# Patient Record
Sex: MALE | Race: WHITE | NOT HISPANIC OR LATINO | Employment: OTHER | URBAN - METROPOLITAN AREA
[De-identification: names, ages, dates, MRNs, and addresses within clinical notes are randomized per-mention and may not be internally consistent; named-entity substitution may affect disease eponyms.]

---

## 2017-01-30 ENCOUNTER — HOSPITAL ENCOUNTER (OUTPATIENT)
Dept: SLEEP MEDICINE | Facility: HOSPITAL | Age: 82
Discharge: HOME | End: 2017-01-30
Admitting: INTERNAL MEDICINE

## 2017-01-30 PROCEDURE — G0463 HOSPITAL OUTPT CLINIC VISIT: HCPCS

## 2017-01-31 NOTE — PROGRESS NOTES
DATE OF VISIT: 01/30/2017    REFERRING PHYSICIAN: Riki Olivares MD    HISTORY OF PRESENT ILLNESS: This is a very pleasant 88-year-old gentleman here for followup. The patient has underlying history of sleep apnea, currently set up on CPAP 10 cm. Compliance today is 52%, but in the last 1-1/2 months it is 100% with AHI and leak both excellent. At this time, the patient goes to bed at 10 and gets up at 6, gets about 8 hours of sleep and feels rested. No tobacco abuse. No caffeine abuse. [employer] is the [employer]. Full face mask.    REVIEW OF SYSTEMS: Positive for cough. [place] Sleepiness Scale Score is 6 out of 24, within normal limits.     MEDICATIONS:  1. Lasix.  2. Amiodarone.  3. Levothyroxine.  4. Finasteride.  5. Zantac.  6. Zyrtec.    PHYSICAL EXAMINATION:  GENERAL: Elderly gentleman in no distress.   VITAL SIGNS: Blood pressure 109/60, heart rate 75, weight 188 pounds.   HEENT: Mallampati II.   NECK: Supple. No bruit. No adenopathy.   CHEST: Clear.   CARDIOVASCULAR: Regular rate and rhythm.   ABDOMEN: Soft, nontender. Bowel sounds positive.   EXTREMITIES: No edema.   CENTRAL NERVOUS SYSTEM: No deficits.     IMPRESSION: Obstructive sleep apnea syndrome with comorbidities of hypertension, pacemaker and history of AFib.     RECOMMENDATIONS: At this point, I reviewed download with the patient. His compliance has improved in the last 1-1/2 months. Continue CPAP 10 cm. Supplies were sent to DME company. Sleep hygiene measures reinforced. Follow up in 1 year.           Alex Kat M.D.  TS:[email or web address]  D:   01/30/2017 13:36:47  T:   01/31/2017 03:13:36  Job ID:   [id number]  Document ID:   [id number]  cc:   Riki Olivares M.D.

## 2017-03-21 ENCOUNTER — CLINICAL SUPPORT NO REQUIREMENTS (OUTPATIENT)
Dept: CARDIOLOGY | Facility: CLINIC | Age: 82
End: 2017-03-21

## 2017-03-21 ENCOUNTER — OFFICE VISIT (OUTPATIENT)
Dept: CARDIOLOGY | Facility: CLINIC | Age: 82
End: 2017-03-21

## 2017-03-21 VITALS
WEIGHT: 185 LBS | SYSTOLIC BLOOD PRESSURE: 132 MMHG | HEIGHT: 78 IN | BODY MASS INDEX: 21.4 KG/M2 | DIASTOLIC BLOOD PRESSURE: 70 MMHG | HEART RATE: 71 BPM

## 2017-03-21 DIAGNOSIS — I34.0 NON-RHEUMATIC MITRAL REGURGITATION: Primary | ICD-10-CM

## 2017-03-21 DIAGNOSIS — I42.8 NON-ISCHEMIC CARDIOMYOPATHY (HCC): ICD-10-CM

## 2017-03-21 DIAGNOSIS — I50.9 CONGESTIVE HEART FAILURE, UNSPECIFIED CONGESTIVE HEART FAILURE CHRONICITY, UNSPECIFIED CONGESTIVE HEART FAILURE TYPE: Primary | ICD-10-CM

## 2017-03-21 DIAGNOSIS — I48.20 CHRONIC ATRIAL FIBRILLATION (HCC): ICD-10-CM

## 2017-03-21 DIAGNOSIS — Z95.0 STATUS POST BIVENTRICULAR PACEMAKER: ICD-10-CM

## 2017-03-21 PROCEDURE — 93000 ELECTROCARDIOGRAM COMPLETE: CPT | Performed by: INTERNAL MEDICINE

## 2017-03-21 PROCEDURE — 99213 OFFICE O/P EST LOW 20 MIN: CPT | Performed by: INTERNAL MEDICINE

## 2017-03-21 PROCEDURE — 93280 PM DEVICE PROGR EVAL DUAL: CPT | Performed by: INTERNAL MEDICINE

## 2017-03-21 NOTE — PROGRESS NOTES
Subjective:     Encounter Date:03/21/2017      Patient ID: Zeb Obando Jr. is a 88 y.o. male.    Chief Complaint:  History of Present Illness     The patient is an 88-year-old male with a history of prior nonischemic cardiomyopathy, nonobstructive coronary artery disease, chronic atrial fibrillation, history of ventricular tachycardia arrest, prior biventricular AICD, which has since been replaced with a biventricular pacemaker, and hypothyroidism who presents for follow-up.  He was followed previously by both Dr. Garcia and Dr. Whitney with [facility].  His prior cardiac history includes placement of a permanent pacemaker in 1981 for a complete heart block.  In 2006, he presented with a syncopal episode that was associated with ventricular tachycardia/ventricular fibrillation episode that was detected on his pacemaker.  They abandoned his right-sided pacemaker and placed a biventricular AICD in his left side.  An ischemic workup including a cardiac catheterization showed mild coronary artery disease.  His ejection fraction was around 10% to 15%.  Following his biventricular AICD placement, repeat echocardiogram in 04/2014 showed his ejection fraction improved to 45% to 50%.  In 10/2015, his device was at the [facility] and, after a conversation with his family, the patient decided he no longer wanted the AICD and had it replaced with a biventricular pacemaker.  He also has a history of chronic atrial fibrillation for which he is not on anticoagulation because of frailty and recurrent falls.      I saw him initially in 03/2016 when he presented to [facility].  He underwent an echocardiogram in 06/2016 that showed normal left ventricular systolic function and wall motion, ejection fraction of 60%, normal diastolic function, and mitral valve prolapse of the anterior leaflet with severe mitral regurgitation. His right ventricular pressure was mildly elevated.  He was doing well at that time.  He was  planning on getting surgery done on a hernia.  Based on his lack of symptoms, I decided to continue his medical management and felt he could proceed with his planned surgery.      Today, he presents for routine follow-up.  He has been having a lot of diaphragmatic pacing from his pacemaker that he has found that he has been having issues with.  Several changes were tried during his pacemaker check today.  Because he is pacemaker dependent, the patient became quite symptomatic while these changes were tried.  The final changes made were to increase the left ventricular pulse to one millisecond, left ventricular amplitude was decreased to 1.25 volts (from 1.5 volts), and the left ventricular captured management to adaptive with a positive 0.5-volt safety margin and a maximum amplitude of 1.5 volts.      Today, in the office, he reports that he just feels different.  He does not quite feel lightheaded or presyncopal.  He otherwise denies any chest pain, shortness of breath, paroxysmal nocturnal dyspnea or orthopnea, presyncope or syncope, or lower extremity edema.  This feeling he reports just started after his pacemaker evaluation.          Review of Systems   Constitution: Negative for weakness and malaise/fatigue.   HENT: Negative for headaches, hearing loss, hoarse voice, nosebleeds and sore throat.    Eyes: Negative for pain.   Cardiovascular: Negative for chest pain, claudication, cyanosis, dyspnea on exertion, irregular heartbeat, leg swelling, near-syncope, orthopnea, palpitations, paroxysmal nocturnal dyspnea and syncope.   Respiratory: Negative for shortness of breath and snoring.    Endocrine: Negative for cold intolerance, heat intolerance, polydipsia, polyphagia and polyuria.   Skin: Negative for itching and rash.   Musculoskeletal: Negative for arthritis, falls, joint pain, joint swelling, muscle cramps, muscle weakness and myalgias.   Gastrointestinal: Negative for constipation, diarrhea, dysphagia,  heartburn, hematemesis, hematochezia, melena, nausea and vomiting.   Genitourinary: Negative for frequency, hematuria and hesitancy.   Neurological: Negative for excessive daytime sleepiness, dizziness, light-headedness and numbness.   Psychiatric/Behavioral: Negative for depression. The patient is not nervous/anxious.           Current Outpatient Prescriptions:   •  Acetaminophen (TYLENOL PO), Take 325 mg by mouth As Needed., Disp: , Rfl:   •  amiodarone (PACERONE) 200 MG tablet, Take 100 mg by mouth Every Night., Disp: , Rfl:   •  cetirizine (ZyrTEC) 10 MG tablet, Take 10 mg by mouth daily., Disp: , Rfl:   •  docusate sodium (COLACE) 100 MG capsule, Take 1 capsule by mouth 2 (Two) Times a Day., Disp: 60 capsule, Rfl: 1  •  finasteride (PROSCAR) 5 MG tablet, Take 5 mg by mouth Daily., Disp: , Rfl:   •  furosemide (LASIX) 40 MG tablet, Take 40 mg by mouth Daily., Disp: , Rfl:   •  levothyroxine (SYNTHROID, LEVOTHROID) 100 MCG tablet, Take 100 mcg by mouth Every Morning., Disp: , Rfl:   •  RaNITidine HCl (ZANTAC PO), Take 1 tablet by mouth As Needed., Disp: , Rfl:   •  triamcinolone (KENALOG) 0.1 % lotion, Apply 1 application topically As Needed., Disp: , Rfl:     Past Medical History   Diagnosis Date   • Abnormal ECG 1979   • AICD (automatic cardioverter/defibrillator) present    • Atrial fibrillation    • AV block    • Blockage of kidney vein    • BPH (benign prostatic hypertrophy)    • Cardiac arrest    • Cardiomyopathy    • CHF (congestive heart failure)    • Chronic kidney disease    • Clotting disorder    • Coronary artery disease    • Disease of thyroid gland    • Diverticulitis of colon    • Diverticulosis    • Edema    • Gallbladder disease    • GI (gastrointestinal bleed)      while on coumadin   • Health care maintenance    • Hearing loss of both ears    • Heart disease    • Heart failure    • History of benign prostatic hypertrophy    • History of cataract    • History of heart failure    • History of  hypertension    • History of prostatitis    • Hoarseness    • Hypertension    • Inguinal hernia    • Irregular heart rate    • Itchy skin    • Lupus    • Muscle weakness    • Prostatitis    • SLE (systemic lupus erythematosus) 1980   • Sleep apnea    • Systemic lupus erythematosus    • Thyroid disease    • VT (ventricular tachycardia)    • Wears glasses    • Weight loss      Past Surgical History   Procedure Laterality Date   • Cholecystectomy  1995   • Pacemaker implantation       RIGHT CHEST-NONFUNCTIONING   • Coronary angioplasty     • Cataract extraction     • Nose surgery     • Cardiac pacemaker placement  10/30/2015     [facility] [facility] PAC[facility]-DR. REINA KATZ-LEFT CHEST   • Cystoscopy transurethral resection of prostate       X 2   • Inguinal hernia repair Right 10/10/2016     Procedure: LAPAROSCOPIC RIGHT INGUINAL HERNIA  REPAIR WITH MESH;  Surgeon: Saad Cardenas MD;  Location: [facility] OR;  Service:      Family History   Problem Relation Age of Onset   • Heart disease Mother      Cancer & Heart   • Cancer Mother      vaginal   • Heart disease Brother      pacemaker   • Lung cancer Brother    • Lung cancer Father    • Alcohol abuse Father    • Heart disease Brother      Cancer     Social History   Substance Use Topics   • Smoking status: Former Smoker     Packs/day: 0.25     Years: 0.50     Start date: 9/1/1953     Quit date: 1953   • Smokeless tobacco: Never Used   • Alcohol use Yes     0 Glasses of wine, 0 Cans of beer, 0 Shots of liquor, 0 Standard drinks or equivalent per week      Comment: Beer with boiled seafood; wine S TOASTS           ECG 12 Lead  Date/Time: 3/21/2017 4:31 PM  Performed by: MIRLANDE ANDERSON  Authorized by: MIRLANDE ANDERSON   Comparison: compared with previous ECG   Similar to previous ECG  Comments: Ventricular paced rhythm               Objective:         Visit Vitals   • /70 (BP Location: Right arm, Patient Position: Sitting)   • Pulse 71   • Ht  "80\" (203.2 cm)   • Wt 185 lb (83.9 kg)   • BMI 20.32 kg/m2          Physical Exam   Constitutional: He is oriented to person, place, and time. He appears well-developed and well-nourished.   HENT:   Head: Normocephalic and atraumatic.   Eyes: Conjunctivae, EOM and lids are normal. Pupils are equal, round, and reactive to light.   Neck: Normal range of motion and full passive range of motion without pain. Neck supple. No JVD present. Carotid bruit is not present.   Cardiovascular: Normal rate, regular rhythm, S1 normal and S2 normal.  Exam reveals no gallop.    No murmur heard.  Pulses:       Radial pulses are 2+ on the right side, and 2+ on the left side.   No bilateral lower extremity edema   Pulmonary/Chest: Effort normal and breath sounds normal.   Abdominal: Soft. Normal appearance.   Lymphadenopathy:     He has no cervical adenopathy.   Neurological: He is alert and oriented to person, place, and time.   Skin: Skin is warm, dry and intact.   Psychiatric: He has a normal mood and affect.       Lab Review:       Assessment:          Diagnosis Plan   1. Non-rheumatic mitral regurgitation     2. Chronic atrial fibrillation     3. Status post biventricular pacemaker     4. History of non-ischemic cardiomyopathy            Plan:        1. Status post biventricular pacemaker.  Again, he has had changes done to help cut down on his diaphragmatic pacing today.  He says he is a little different since the pacemaker changes were made.  I suspect this is more due to the symptoms he had while different changes were being tried.  It appears that he was quite symptomatic while things were being changed.  I asked him to monitor his symptoms for now and, if they do not improve, to let me know.  I suspect they will if we give him some time.  Hopefully, the changes that were made today in the clinic should help some with his diaphragmatic pacing.    2. History of nonischemic cardiomyopathy.  This has since resolved.  His last " echocardiogram shows an ejection fraction of 60%.    3. Mitral regurgitation.  He had severe prolapse of anterior leaflet with severe mitral regurgitation.  He remains asymptomatic.  He will continue medical management with Lasix.    4. Atrial fibrillation.  He is not a good anticoagulation candidate.  He appears to be in sinus rhythm.  He is on amiodarone for his history of ventricular ectopy.    5. Nonobstructive coronary artery disease noted on cardiac catheterization from 2006.  He is not having any further symptoms that would warrant further ischemic workup at this time.    6. Hypothyroidism.      We will plan on seeing the patient back again in about six months.      Atrial Fibrillation and Atrial Flutter  Assessment  • The patient has permanent atrial fibrillation  • This is non-valvular in etiology  • The patient's CHADS2-VASc score is 3  • A [id number] score of 2 or more is considered a high risk for a thromboembolic event    Plan  • Continue in atrial fibrillation with rate control  • Continue amiodarone for rhythm control

## 2017-06-21 ENCOUNTER — CLINICAL SUPPORT NO REQUIREMENTS (OUTPATIENT)
Dept: CARDIOLOGY | Facility: CLINIC | Age: 82
End: 2017-06-21

## 2017-06-21 DIAGNOSIS — I50.22 CHRONIC SYSTOLIC CONGESTIVE HEART FAILURE (HCC): Primary | ICD-10-CM

## 2017-06-21 PROCEDURE — 93297 REM INTERROG DEV EVAL ICPMS: CPT | Performed by: INTERNAL MEDICINE

## 2017-06-21 PROCEDURE — 93294 REM INTERROG EVL PM/LDLS PM: CPT | Performed by: INTERNAL MEDICINE

## 2017-06-21 PROCEDURE — 93296 REM INTERROG EVL PM/IDS: CPT | Performed by: INTERNAL MEDICINE

## 2017-09-22 ENCOUNTER — CLINICAL SUPPORT NO REQUIREMENTS (OUTPATIENT)
Dept: CARDIOLOGY | Facility: CLINIC | Age: 82
End: 2017-09-22

## 2017-09-22 ENCOUNTER — OFFICE VISIT (OUTPATIENT)
Dept: CARDIOLOGY | Facility: CLINIC | Age: 82
End: 2017-09-22

## 2017-09-22 VITALS
DIASTOLIC BLOOD PRESSURE: 68 MMHG | SYSTOLIC BLOOD PRESSURE: 102 MMHG | HEIGHT: 72 IN | WEIGHT: 179 LBS | HEART RATE: 72 BPM | BODY MASS INDEX: 24.24 KG/M2

## 2017-09-22 DIAGNOSIS — I50.22 CHRONIC SYSTOLIC CONGESTIVE HEART FAILURE (HCC): ICD-10-CM

## 2017-09-22 DIAGNOSIS — Z95.0 CARDIAC RESYNCHRONIZATION THERAPY PACEMAKER (CRT-P) IN PLACE: ICD-10-CM

## 2017-09-22 DIAGNOSIS — I48.20 CHRONIC ATRIAL FIBRILLATION (HCC): Primary | ICD-10-CM

## 2017-09-22 DIAGNOSIS — I42.8 NONISCHEMIC CARDIOMYOPATHY (HCC): ICD-10-CM

## 2017-09-22 DIAGNOSIS — I42.8 NON-ISCHEMIC CARDIOMYOPATHY (HCC): ICD-10-CM

## 2017-09-22 DIAGNOSIS — I34.0 NON-RHEUMATIC MITRAL REGURGITATION: ICD-10-CM

## 2017-09-22 PROCEDURE — 99213 OFFICE O/P EST LOW 20 MIN: CPT | Performed by: INTERNAL MEDICINE

## 2017-09-22 PROCEDURE — 93290 INTERROG DEV EVAL ICPMS IP: CPT | Performed by: INTERNAL MEDICINE

## 2017-09-22 PROCEDURE — 93280 PM DEVICE PROGR EVAL DUAL: CPT | Performed by: INTERNAL MEDICINE

## 2017-09-22 PROCEDURE — 93000 ELECTROCARDIOGRAM COMPLETE: CPT | Performed by: INTERNAL MEDICINE

## 2017-09-22 NOTE — PROGRESS NOTES
Subjective:     Encounter Date:09/22/2017      Patient ID: Zeb Obando Jr. is a 89 y.o. male.    Chief Complaint:  History of Present Illness    This is a 89-year-old male with a history of prior nonischemic cardiomyopathy, nonobstructive coronary artery disease, chronic atrial fibrillation, history of ventricular tachycardia arrest, biventricular pacemaker, hypothyroidism who presents for follow-up.    The patient was previously followed by Dr. Garcia and Dr. Whitney with [facility].  His prior cardiac history includes placement of a permanent pacemaker in 1981 for complete heart block.  And in 2006 he presented with a syncopal episode that was associated with ventricular tachycardia/ventricular fibrillation episode that was detected on this pacemaker.  He was found to have an ejection fraction of around 10-15% around that time.  A cardiac catheterization showed only mild coronary artery disease.  At that point they abandoned his right-sided pacemaker in place a biventricular AICD on his left side.  Following his BiVICD placement a repeat echocardiogram was performed in 4/2014 that showed improvement of his ejection fraction to 45-50%.  In 10/2015 his device was at [facility] and after conversation with his family patient decided he no longer wanted the AICD and had it replaced with a biventricular pacemaker.   He has a history of chronic atrial fibrillation for which she has not been on anticoagulation because of overall frailty and recurrent falls.    When I began following the patient 3/2016 I repeated echocardiogram that was performed in 6/2016.  This showed normal left ventricular systolic function and wall motion with an ejection fraction of 60%, normal diastolic function, mitral valve prolapse of the anterior leaflet with severe mitral regurgitation, mildly elevated right ventricular pressures.    Today he presents for routine follow-up.  He feels well overall today.  He denies any change in  his chronic dyspnea on exertion.  He denies any chest pain, palpitations, dizziness or lightheadedness, PND orthopnea, presyncope or syncope, or lower extremity edema.  Denies any changes in his medications recently.  He reports that he has a full physical scheduled with Dr. Olivares later this month and will get lab work and a chest x-ray done at that time.    Review of Systems   Constitution: Negative for weakness and malaise/fatigue.   HENT: Negative for hearing loss, hoarse voice, nosebleeds and sore throat.    Eyes: Negative for pain.   Cardiovascular: Positive for dyspnea on exertion. Negative for chest pain, claudication, cyanosis, irregular heartbeat, leg swelling, near-syncope, orthopnea, palpitations, paroxysmal nocturnal dyspnea and syncope.   Respiratory: Negative for shortness of breath and snoring.    Endocrine: Negative for cold intolerance, heat intolerance, polydipsia, polyphagia and polyuria.   Skin: Negative for itching and rash.   Musculoskeletal: Negative for arthritis, falls, joint pain, joint swelling, muscle cramps, muscle weakness and myalgias.   Gastrointestinal: Negative for constipation, diarrhea, dysphagia, heartburn, hematemesis, hematochezia, melena, nausea and vomiting.   Genitourinary: Positive for frequency. Negative for hematuria and hesitancy.   Neurological: Negative for excessive daytime sleepiness, dizziness, headaches, light-headedness and numbness.   Psychiatric/Behavioral: Negative for depression. The patient is not nervous/anxious.           Current Outpatient Prescriptions:   •  Acetaminophen (TYLENOL PO), Take 325 mg by mouth As Needed., Disp: , Rfl:   •  amiodarone (PACERONE) 200 MG tablet, Take 100 mg by mouth Every Night., Disp: , Rfl:   •  cetirizine (ZyrTEC) 10 MG tablet, Take 10 mg by mouth daily., Disp: , Rfl:   •  docusate sodium (COLACE) 100 MG capsule, Take 1 capsule by mouth 2 (Two) Times a Day., Disp: 60 capsule, Rfl: 1  •  finasteride (PROSCAR) 5 MG tablet, Take  5 mg by mouth Daily., Disp: , Rfl:   •  furosemide (LASIX) 40 MG tablet, Take 40 mg by mouth Daily., Disp: , Rfl:   •  levothyroxine (SYNTHROID, LEVOTHROID) 100 MCG tablet, Take 100 mcg by mouth Every Morning., Disp: , Rfl:   •  RaNITidine HCl (ZANTAC PO), Take 1 tablet by mouth As Needed., Disp: , Rfl:   •  triamcinolone (KENALOG) 0.1 % lotion, Apply 1 application topically As Needed., Disp: , Rfl:     Past Medical History:   Diagnosis Date   • Abnormal ECG 1979   • AICD (automatic cardioverter/defibrillator) present    • Atrial fibrillation    • AV block    • Blockage of kidney vein    • BPH (benign prostatic hypertrophy)    • Cardiac arrest    • Cardiomyopathy    • CHF (congestive heart failure)    • Chronic kidney disease    • Clotting disorder    • Coronary artery disease    • Disease of thyroid gland    • Diverticulitis of colon    • Diverticulosis    • Edema    • Gallbladder disease    • GI (gastrointestinal bleed)     while on coumadin   • Health care maintenance    • Hearing loss of both ears    • Heart disease    • Heart failure    • History of benign prostatic hypertrophy    • History of cataract    • History of heart failure    • History of hypertension    • History of prostatitis    • Hoarseness    • Hypertension    • Inguinal hernia    • Irregular heart rate    • Itchy skin    • Lupus    • Muscle weakness    • Prostatitis    • SLE (systemic lupus erythematosus) 1980   • Sleep apnea    • Systemic lupus erythematosus    • Thyroid disease    • VT (ventricular tachycardia)    • Wears glasses    • Weight loss      Past Surgical History:   Procedure Laterality Date   • CARDIAC PACEMAKER PLACEMENT  10/30/2015    [employer] PAC[employer]-DR. REINA KATZ-LEFT CHEST   • CATARACT EXTRACTION     • CHOLECYSTECTOMY  1995   • CORONARY ANGIOPLASTY     • CYSTOSCOPY TRANSURETHRAL RESECTION OF PROSTATE      X 2   • INGUINAL HERNIA REPAIR Right 10/10/2016    Procedure: LAPAROSCOPIC RIGHT INGUINAL HERNIA   "REPAIR WITH MESH;  Surgeon: Saad Cardenas MD;  Location: [facility] OR;  Service:    • NOSE SURGERY     • PACEMAKER IMPLANTATION      RIGHT CHEST-NONFUNCTIONING     Family History   Problem Relation Age of Onset   • Heart disease Mother      Cancer & Heart   • Cancer Mother      vaginal   • Heart disease Brother      pacemaker   • Lung cancer Brother    • Lung cancer Father    • Alcohol abuse Father    • Heart disease Brother      Cancer     Social History   Substance Use Topics   • Smoking status: Former Smoker     Packs/day: 0.25     Years: 0.50     Start date: 9/1/1953     Quit date: 1953   • Smokeless tobacco: Never Used   • Alcohol use Yes     0 Glasses of wine, 0 Cans of beer, 0 Shots of liquor, 0 Standard drinks or equivalent per week      Comment: Beer with boiled seafood; wine S TOASTS           ECG 12 Lead  Date/Time: 9/22/2017 1:31 PM  Performed by: MIRLANDE ANDERSON  Authorized by: MIRLANDE ANDERSON   Comparison: compared with previous ECG   Similar to previous ECG  Comments: Biventricular paced rhythm               Objective:         Visit Vitals   • /68 (BP Location: Right arm, Patient Position: Sitting)   • Pulse 72   • Ht 72\" (182.9 cm)   • Wt 179 lb (81.2 kg)   • BMI 24.28 kg/m2          Physical Exam   Constitutional: He is oriented to person, place, and time. He appears well-developed and well-nourished.   HENT:   Head: Normocephalic and atraumatic.   Eyes: Conjunctivae, EOM and lids are normal. Pupils are equal, round, and reactive to light.   Neck: Normal range of motion and full passive range of motion without pain. Neck supple. No JVD present. Carotid bruit is not present.   Cardiovascular: Normal rate, regular rhythm, S1 normal and S2 normal.  Exam reveals no gallop.    No murmur heard.  Pulses:       Radial pulses are 2+ on the right side, and 2+ on the left side.   No bilateral lower extremity edema   Pulmonary/Chest: Effort normal and breath sounds normal.   Abdominal: Soft. " Normal appearance.   Lymphadenopathy:     He has no cervical adenopathy.   Neurological: He is alert and oriented to person, place, and time.   Skin: Skin is warm, dry and intact.   Psychiatric: He has a normal mood and affect.       Lab Review:       Assessment:          Diagnosis Plan   1. Chronic atrial fibrillation     2. History of non-ischemic cardiomyopathy     3. Non-rheumatic mitral regurgitation     4. Cardiac resynchronization therapy pacemaker (CRT-P) in place            Plan:       1.  Status post biventricular pacemaker.  Functioning normally today.  No recent events.  Continue routine checks through pacemaker clinic.  2.  History of nonischemic cardiomyopathy.  This has resolved.  Most recent ejection fraction was at 60%.  3.  Mitral regurgitation.  There is severe and his last echocardiogram.  He is asymptomatic.  Continue current management.  4.  Atrial fibrillation.  He appears to be in sinus rhythm today.  He is on amiodarone for his history of ventricular ectopy.  He has not been felt to be a good candidate for anticoagulation due to history of falls and overall frailty.  5.  Nonobstructive coronary artery disease.  Noted on his last cardiac catheterization in 2006.  No new symptoms to warrant a repeat ischemic workup at this time.  6.  Hypothyroidism.  7.  Chronic amiodarone use.  We'll try to obtain copies of his upcoming lab work including his liver panel and thyroid function panel along with chest x-ray results due to his chronic amiodarone use.  8.  History of ventricular tachycardia/fibrillation.  Related to his prior history of cardiomyopathy.    We'll plan on seeing the patient back in 6 months.

## 2017-10-11 ENCOUNTER — TRANSCRIBE ORDERS (OUTPATIENT)
Dept: ADMINISTRATIVE | Facility: HOSPITAL | Age: 82
End: 2017-10-11

## 2017-10-11 DIAGNOSIS — R10.9 RIGHT SIDED ABDOMINAL PAIN: Primary | ICD-10-CM

## 2017-10-16 ENCOUNTER — HOSPITAL ENCOUNTER (OUTPATIENT)
Dept: CT IMAGING | Facility: HOSPITAL | Age: 82
Discharge: HOME | End: 2017-10-16
Attending: INTERNAL MEDICINE | Admitting: INTERNAL MEDICINE

## 2017-10-16 DIAGNOSIS — R10.9 RIGHT SIDED ABDOMINAL PAIN: ICD-10-CM

## 2017-10-16 PROCEDURE — 74176 CT ABD & PELVIS W/O CONTRAST: CPT

## 2017-12-04 ENCOUNTER — TRANSCRIBE ORDERS (OUTPATIENT)
Dept: ADMINISTRATIVE | Facility: HOSPITAL | Age: 82
End: 2017-12-04

## 2017-12-04 DIAGNOSIS — N18.4 ANEMIA IN STAGE 4 CHRONIC KIDNEY DISEASE (HCC): Primary | ICD-10-CM

## 2017-12-04 DIAGNOSIS — N18.4 CHRONIC RENAL DISEASE, STAGE IV (HCC): ICD-10-CM

## 2017-12-04 DIAGNOSIS — D63.1 ANEMIA IN STAGE 4 CHRONIC KIDNEY DISEASE (HCC): Primary | ICD-10-CM

## 2017-12-06 PROBLEM — D63.1 ANEMIA OF CHRONIC RENAL FAILURE: Status: ACTIVE | Noted: 2017-12-06

## 2017-12-06 PROBLEM — N18.9 ANEMIA OF CHRONIC RENAL FAILURE: Status: ACTIVE | Noted: 2017-12-06

## 2017-12-07 ENCOUNTER — HOSPITAL ENCOUNTER (OUTPATIENT)
Dept: INFUSION THERAPY | Facility: HOSPITAL | Age: 82
Discharge: HOME | End: 2017-12-07
Attending: INTERNAL MEDICINE | Admitting: INTERNAL MEDICINE

## 2017-12-07 VITALS
HEART RATE: 79 BPM | OXYGEN SATURATION: 96 % | BODY MASS INDEX: 24.92 KG/M2 | RESPIRATION RATE: 20 BRPM | SYSTOLIC BLOOD PRESSURE: 137 MMHG | WEIGHT: 184 LBS | TEMPERATURE: 95 F | HEIGHT: 72 IN | DIASTOLIC BLOOD PRESSURE: 67 MMHG

## 2017-12-07 DIAGNOSIS — D63.1 ANEMIA OF CHRONIC RENAL FAILURE, STAGE 4 (SEVERE) (HCC): ICD-10-CM

## 2017-12-07 DIAGNOSIS — N18.4 ANEMIA OF CHRONIC RENAL FAILURE, STAGE 4 (SEVERE) (HCC): ICD-10-CM

## 2017-12-07 LAB
25(OH)D3 SERPL-MCNC: 59.4 NG/ML (ref 30–100)
ALBUMIN SERPL-MCNC: 3.8 G/DL (ref 3.5–5.2)
ALBUMIN/GLOB SERPL: 1 G/DL
ALP SERPL-CCNC: 77 U/L (ref 39–117)
ALT SERPL W P-5'-P-CCNC: 9 U/L (ref 1–41)
ANION GAP SERPL CALCULATED.3IONS-SCNC: 12.2 MMOL/L
AST SERPL-CCNC: 18 U/L (ref 1–40)
BILIRUB SERPL-MCNC: 0.7 MG/DL (ref 0.1–1.2)
BUN BLD-MCNC: 35 MG/DL (ref 8–23)
BUN/CREAT SERPL: 12.2 (ref 7–25)
CALCIUM SPEC-SCNC: 9.2 MG/DL (ref 8.6–10.5)
CALCIUM SPEC-SCNC: 9.4 MG/DL (ref 8.6–10.5)
CHLORIDE SERPL-SCNC: 106 MMOL/L (ref 98–107)
CO2 SERPL-SCNC: 23.8 MMOL/L (ref 22–29)
CREAT BLD-MCNC: 2.88 MG/DL (ref 0.76–1.27)
DEPRECATED RDW RBC AUTO: 53.3 FL (ref 37–54)
ERYTHROCYTE [DISTWIDTH] IN BLOOD BY AUTOMATED COUNT: 15.3 % (ref 11.5–14.5)
FERRITIN SERPL-MCNC: 46.16 NG/ML (ref 30–400)
FOLATE SERPL-MCNC: 16.89 NG/ML (ref 4.78–24.2)
GFR SERPL CREATININE-BSD FRML MDRD: 21 ML/MIN/1.73
GLOBULIN UR ELPH-MCNC: 3.9 GM/DL
GLUCOSE BLD-MCNC: 94 MG/DL (ref 65–99)
HCT VFR BLD AUTO: 29.7 % (ref 40.4–52.2)
HGB BLD-MCNC: 9.3 G/DL (ref 13.7–17.6)
IRON 24H UR-MRATE: 50 MCG/DL (ref 59–158)
IRON SATN MFR SERPL: 14 % (ref 20–50)
MCH RBC QN AUTO: 29.8 PG (ref 27–32.7)
MCHC RBC AUTO-ENTMCNC: 31.3 G/DL (ref 32.6–36.4)
MCV RBC AUTO: 95.2 FL (ref 79.8–96.2)
PHOSPHATE SERPL-MCNC: 3.8 MG/DL (ref 2.5–4.5)
PLATELET # BLD AUTO: 146 10*3/MM3 (ref 140–500)
PMV BLD AUTO: 9.3 FL (ref 6–12)
POTASSIUM BLD-SCNC: 4.4 MMOL/L (ref 3.5–5.2)
PROT SERPL-MCNC: 7.7 G/DL (ref 6–8.5)
PTH-INTACT SERPL-MCNC: 20.4 PG/ML (ref 15–65)
RBC # BLD AUTO: 3.12 10*6/MM3 (ref 4.6–6)
SODIUM BLD-SCNC: 142 MMOL/L (ref 136–145)
TIBC SERPL-MCNC: 361 MCG/DL (ref 298–536)
TRANSFERRIN SERPL-MCNC: 242 MG/DL (ref 200–360)
URATE SERPL-MCNC: 7.9 MG/DL (ref 3.4–7)
VIT B12 BLD-MCNC: 334 PG/ML (ref 211–946)
WBC NRBC COR # BLD: 3.2 10*3/MM3 (ref 4.5–10.7)

## 2017-12-07 PROCEDURE — 82607 VITAMIN B-12: CPT | Performed by: INTERNAL MEDICINE

## 2017-12-07 PROCEDURE — 84466 ASSAY OF TRANSFERRIN: CPT | Performed by: INTERNAL MEDICINE

## 2017-12-07 PROCEDURE — 83970 ASSAY OF PARATHORMONE: CPT | Performed by: INTERNAL MEDICINE

## 2017-12-07 PROCEDURE — 36415 COLL VENOUS BLD VENIPUNCTURE: CPT

## 2017-12-07 PROCEDURE — 82728 ASSAY OF FERRITIN: CPT | Performed by: INTERNAL MEDICINE

## 2017-12-07 PROCEDURE — 85027 COMPLETE CBC AUTOMATED: CPT | Performed by: INTERNAL MEDICINE

## 2017-12-07 PROCEDURE — 63510000001 EPOETIN ALFA PER 1000 UNITS: Performed by: INTERNAL MEDICINE

## 2017-12-07 PROCEDURE — 96372 THER/PROPH/DIAG INJ SC/IM: CPT

## 2017-12-07 PROCEDURE — 83540 ASSAY OF IRON: CPT | Performed by: INTERNAL MEDICINE

## 2017-12-07 PROCEDURE — 82306 VITAMIN D 25 HYDROXY: CPT | Performed by: INTERNAL MEDICINE

## 2017-12-07 PROCEDURE — 82746 ASSAY OF FOLIC ACID SERUM: CPT | Performed by: INTERNAL MEDICINE

## 2017-12-07 PROCEDURE — 84100 ASSAY OF PHOSPHORUS: CPT | Performed by: INTERNAL MEDICINE

## 2017-12-07 PROCEDURE — 84550 ASSAY OF BLOOD/URIC ACID: CPT | Performed by: INTERNAL MEDICINE

## 2017-12-07 PROCEDURE — 80053 COMPREHEN METABOLIC PANEL: CPT | Performed by: INTERNAL MEDICINE

## 2017-12-07 RX ORDER — MELATONIN
1000 DAILY
COMMUNITY

## 2017-12-07 RX ORDER — FERROUS SULFATE 325(65) MG
325 TABLET ORAL
COMMUNITY

## 2017-12-07 RX ADMIN — ERYTHROPOIETIN 20000 UNITS: 20000 INJECTION, SOLUTION INTRAVENOUS; SUBCUTANEOUS at 15:25

## 2017-12-07 NOTE — PROGRESS NOTES
Procrit indicated per lab results & MD order.  Injection site x 1 with band aide applied.  1st dose of Procrit so AVS information given and patient held for 30 minutes post injection with no S/S of reaction or patient complaints.  Pt D/C per ambulation @ [id number].

## 2017-12-07 NOTE — PATIENT INSTRUCTIONS
Epoetin De injection  What is this medicine?  EPOETIN DE (e RAHUL e tin AL fa) helps your body make more red blood cells. This medicine is used to treat anemia caused by chronic kidney failure, cancer chemotherapy, or HIV-therapy. It may also be used before surgery if you have anemia.  This medicine may be used for other purposes; ask your health care provider or pharmacist if you have questions.  COMMON BRAND NAME(S): Epogen, Procrit  What should I tell my health care provider before I take this medicine?  They need to know if you have any of these conditions:  -blood clotting disorders  -cancer patient not on chemotherapy  -cystic fibrosis  -heart disease, such as angina or heart failure  -hemoglobin level of 12 g/dL or greater  -high blood pressure  -low levels of folate, iron, or vitamin B12  -seizures  -an unusual or allergic reaction to erythropoietin, albumin, benzyl alcohol, hamster proteins, other medicines, foods, dyes, or preservatives  -pregnant or trying to get pregnant  -breast-feeding  How should I use this medicine?  This medicine is for injection into a vein or under the skin. It is usually given by a health care professional in a hospital or clinic setting.  If you get this medicine at home, you will be taught how to prepare and give this medicine. Use exactly as directed. Take your medicine at regular intervals. Do not take your medicine more often than directed.  It is important that you put your used needles and syringes in a special sharps container. Do not put them in a trash can. If you do not have a sharps container, call your pharmacist or healthcare provider to get one.  Talk to your pediatrician regarding the use of this medicine in children. While this drug may be prescribed for selected conditions, precautions do apply.  Overdosage: If you think you have taken too much of this medicine contact a poison control center or emergency room at once.  NOTE: This medicine is only for you. Do  not share this medicine with others.  What if I miss a dose?  If you miss a dose, take it as soon as you can. If it is almost time for your next dose, take only that dose. Do not take double or extra doses.  What may interact with this medicine?  Do not take this medicine with any of the following medications:  -darbepoetin nestor  This list may not describe all possible interactions. Give your health care provider a list of all the medicines, herbs, non-prescription drugs, or dietary supplements you use. Also tell them if you smoke, drink alcohol, or use illegal drugs. Some items may interact with your medicine.  What should I watch for while using this medicine?  Visit your prescriber or health care professional for regular checks on your progress and for the needed blood tests and blood pressure measurements. It is especially important for the doctor to make sure your hemoglobin level is in the desired range, to limit the risk of potential side effects and to give you the best benefit. Keep all appointments for any recommended tests. Check your blood pressure as directed. Ask your doctor what your blood pressure should be and when you should contact him or her.  As your body makes more red blood cells, you may need to take iron, folic acid, or vitamin B supplements. Ask your doctor or health care provider which products are right for you. If you have kidney disease continue dietary restrictions, even though this medication can make you feel better. Talk with your doctor or health care professional about the foods you eat and the vitamins that you take.  What side effects may I notice from receiving this medicine?  Side effects that you should report to your doctor or health care professional as soon as possible:  -allergic reactions like skin rash, itching or hives, swelling of the face, lips, or tongue  -breathing problems  -changes in vision  -chest pain  -confusion, trouble speaking or understanding  -feeling  faint or lightheaded, falls  -high blood pressure  -muscle aches or pains  -pain, swelling, warmth in the leg  -rapid weight gain  -severe headaches  -sudden numbness or weakness of the face, arm or leg  -trouble walking, dizziness, loss of balance or coordination  -seizures (convulsions)  -swelling of the ankles, feet, hands  -unusually weak or tired  Side effects that usually do not require medical attention (report to your doctor or health care professional if they continue or are bothersome):  -diarrhea  -fever, chills (flu-like symptoms)  -headaches  -nausea, vomiting  -redness, stinging, or swelling at site where injected  This list may not describe all possible side effects. Call your doctor for medical advice about side effects. You may report side effects to FDA at 4-622-FDA-4488.  Where should I keep my medicine?  Keep out of the reach of children.  Store in a refrigerator between 2 and 8 degrees C (36 and 46 degrees F). Do not freeze or shake. Throw away any unused portion if using a single-dose vial. Multi-dose vials can be kept in the refrigerator for up to 21 days after the initial dose. Throw away unused medicine.  NOTE: This sheet is a summary. It may not cover all possible information. If you have questions about this medicine, talk to your doctor, pharmacist, or health care provider.     © 2017, [employer]. (2009-12-01 10:25:44)  Food Basics for Chronic Kidney Disease  When your kidneys are not working well, they cannot remove waste and excess substances from your blood as effectively as they did before. This can lead to a buildup and imbalance of these substances, which can affect how your body functions. This buildup can also make your kidneys work harder, causing even more damage.  You may need to eat less of certain foods that can lead to the buildup of these substances in your body. By making the changes to your diet that are recommended by your dietitian or health care provider,  you could possibly help prevent further kidney damage and delay or prevent the need for dialysis.  The following information can help give you a basic understanding of these substances and how they affect your bodily functions. The information also gives examples of foods that contain the highest amounts of these substances.  WHAT DO I NEED TO KNOW ABOUT SUBSTANCES IN MY FOOD THAT I MAY NEED TO ADJUST?  Food adjustments will be different for each person with chronic kidney disease. It is important that you see a dietitian who can help you determine the specific adjustments that you will need to make for each of the following substances:  Potassium  Potassium affects how steadily your heart beats. If too much potassium builds up in your blood, it can cause an irregular heartbeat or even a heart attack.  Examples of foods rich in potassium include:  · Milk.  · Fruits.  · Vegetables.  Phosphorus  Phosphorus is a mineral found in your bones. A balance between calcium and phosphorous is needed to build and maintain healthy bones. Too much phosphorus pulls calcium from your bones. This can make your bones weak and more likely to break. Too much phosphorus can also make your skin itch.  Examples of foods rich in phosphorus include:  · Milk and cheese.  · Dried beans.  · Peas.  · Zay.  · Nuts and peanut butter.  Animal Protein  Animal protein helps you make and keep muscle. It also helps in the repair of your body's cells and tissues. One of the natural breakdown products of protein is a waste product called urea. When your kidneys are not working properly, they cannot remove wastes such as urea like they did before you developed chronic kidney disease. You will likely need to limit the amount of protein you eat to help prevent a buildup of urea in your blood. Examples of animal protein include:  · Meat (all types).  · Fish and seafood.  · Poultry.  · Eggs.  Sodium  Sodium, which is found in salt, helps maintain a healthy  balance of fluids in your body. Too much sodium can increase your blood pressure level and have a negative affect on the function of your heart and lungs. Too much sodium also can cause your body to retain too much fluid, making your kidneys work harder. Examples of foods with high levels of sodium include:  · Salt seasonings.  · Soy sauce.  · Cured and processed meats.  · Salted crackers and snack foods.  · Fast food.  · Canned soups and most canned foods.  Glucose  Glucose provides energy for your body. If you have diabetes mellitus that is not properly controlled, you have too much glucose in your blood. Too much glucose in your blood can worsen the function of your kidneys by damaging small blood vessels. This prevents enough blood flow to your kidneys to give them what they need to work. If you have diabetes mellitus and chronic kidney disease, it is important to maintain your blood glucose at a level recommended by your health care provider.  SHOULD I TAKE A VITAMIN AND MINERAL SUPPLEMENT?  Because you may need to avoid eating certain foods, you may not get all of the vitamins and minerals that would normally come from those foods. Your health care provider or dietitian may recommend that you take a supplement to ensure that you get all of the vitamins and minerals that your body needs.      This information is not intended to replace advice given to you by your health care provider. Make sure you discuss any questions you have with your health care provider.     Document Released: 03/09/2004 Document Revised: 01/08/2016 Document Reviewed: 11/14/2014  [employer] Interactive Patient Education ©2017 [employer]  Iron-Rich Diet  Iron is a mineral that helps your body to produce hemoglobin. Hemoglobin is a protein in your red blood cells that carries oxygen to your body's tissues. Eating too little iron may cause you to feel weak and tired, and it can increase your risk for infection. Eating enough iron is  necessary for your body's metabolism, muscle function, and nervous system.  Iron is naturally found in many foods. It can also be added to foods or fortified in foods. There are two types of dietary iron:  · Heme iron. Heme iron is absorbed by the body more easily than nonheme iron. Heme iron is found in meat, poultry, and fish.  · Nonheme iron. Nonheme iron is found in dietary supplements, iron-fortified grains, beans, and vegetables.  You may need to follow an iron-rich diet if:  · You have been diagnosed with iron deficiency or iron-deficiency anemia.  · You have a condition that prevents you from absorbing dietary iron, such as:    Infection in your intestines.    Celiac disease. This involves long-lasting (chronic) inflammation of your intestines.  · You do not eat enough iron.  · You eat a diet that is high in foods that impair iron absorption.  · You have lost a lot of blood.  · You have heavy bleeding during your menstrual cycle.  · You are pregnant.  WHAT IS MY PLAN?  Your health care provider may help you to determine how much iron you need per day based on your condition. Generally, when a person consumes sufficient amounts of iron in the diet, the following iron needs are met:  · Men.    14-18 years old: 11 mg per day.    19-50 years old: 8 mg per day.  · Women.      14-18 years old: 15 mg per day.    19-50 years old: 18 mg per day.    Over 50 years old: 8 mg per day.    Pregnant women: 27 mg per day.    Breastfeeding women: 9 mg per day.  WHAT DO I NEED TO KNOW ABOUT AN IRON-RICH DIET?  · Eat fresh fruits and vegetables that are high in vitamin C along with foods that are high in iron. This will help increase the amount of iron that your body absorbs from food, especially with foods containing nonheme iron. Foods that are high in vitamin C include oranges, peppers, tomatoes, and kary.  · Take iron supplements only as directed by your health care provider. Overdose of iron can be life-threatening. If  you were prescribed iron supplements, take them with orange juice or a vitamin C supplement.  · Cook foods in pots and pans that are made from iron.    · Eat nonheme iron-containing foods alongside foods that are high in heme iron. This helps to improve your iron absorption.    · Certain foods and drinks contain compounds that impair iron absorption. Avoid eating these foods in the same meal as iron-rich foods or with iron supplements. These include:    Coffee, black tea, and red wine.    Milk, dairy products, and foods that are high in calcium.    Beans, soybeans, and peas.    Whole grains.  · When eating foods that contain both nonheme iron and compounds that impair iron absorption, follow these tips to absorb iron better.      Soak beans overnight before cooking.    Soak whole grains overnight and drain them before using.    Ferment flours before baking, such as using yeast in bread dough.  WHAT FOODS CAN I EAT?  Grains   Iron-fortified breakfast cereal. Iron-fortified whole-wheat bread. Enriched rice. Sprouted grains.  Vegetables   Spinach. Potatoes with skin. Green peas. Broccoli. Red and green bell peppers. Fermented vegetables.  Fruits   Prunes. Raisins. Oranges. Strawberries. [place]manuel. Grapefruit.  Meats and Other Protein Sources   Beef liver. Oysters. Beef. Shrimp. Turkey. Chicken. Tuna. Sardines. Chickpeas. Nuts. Tofu.  Beverages   Tomato juice. Fresh orange juice. Prune juice. Hibiscus tea. Fortified instant breakfast shakes.  Condiments   Tahini. Fermented soy sauce.   Sweets and Desserts   Black-strap molasses.   Other   Wheat germ.  The items listed above may not be a complete list of recommended foods or beverages. Contact your dietitian for more options.   WHAT FOODS ARE NOT RECOMMENDED?  Grains   Whole grains. Bran cereal. Bran flour. Oats.  Vegetables   Artichokes. [place] sprouts. Kale.  Fruits   Blueberries. Raspberries. Strawberries. Figs.  Meats and Other Protein Sources   Soybeans. Products made  from soy protein.  Dairy   Milk. Cream. Cheese. Yogurt. Cottage cheese.  Beverages   Coffee. Black tea. Red wine.  Sweets and Desserts   Cocoa. Chocolate. Ice cream.  Other   Basil. Oregano. Parsley.  The items listed above may not be a complete list of foods and beverages to avoid. Contact your dietitian for more information.      This information is not intended to replace advice given to you by your health care provider. Make sure you discuss any questions you have with your health care provider.     Document Released: 08/01/2006 Document Revised: 01/08/2016 Document Reviewed: 07/15/2015  "[employer] Interactive Patient Education ©2017 "[employer]

## 2017-12-21 ENCOUNTER — TRANSCRIBE ORDERS (OUTPATIENT)
Dept: ADMINISTRATIVE | Facility: HOSPITAL | Age: 82
End: 2017-12-21

## 2017-12-21 ENCOUNTER — HOSPITAL ENCOUNTER (OUTPATIENT)
Dept: INFUSION THERAPY | Facility: HOSPITAL | Age: 82
Discharge: HOME | End: 2017-12-21
Attending: INTERNAL MEDICINE | Admitting: INTERNAL MEDICINE

## 2017-12-21 VITALS
HEART RATE: 73 BPM | OXYGEN SATURATION: 95 % | TEMPERATURE: 98.2 F | RESPIRATION RATE: 16 BRPM | SYSTOLIC BLOOD PRESSURE: 123 MMHG | DIASTOLIC BLOOD PRESSURE: 68 MMHG

## 2017-12-21 DIAGNOSIS — N18.4 ANEMIA OF CHRONIC RENAL FAILURE, STAGE 4 (SEVERE) (HCC): ICD-10-CM

## 2017-12-21 DIAGNOSIS — D63.1 ANEMIA OF RENAL DISEASE: Primary | ICD-10-CM

## 2017-12-21 DIAGNOSIS — N18.4 CHRONIC RENAL DISEASE, STAGE IV (HCC): ICD-10-CM

## 2017-12-21 DIAGNOSIS — D63.1 ANEMIA OF CHRONIC RENAL FAILURE, STAGE 4 (SEVERE) (HCC): ICD-10-CM

## 2017-12-21 DIAGNOSIS — N18.9 ANEMIA OF RENAL DISEASE: Primary | ICD-10-CM

## 2017-12-21 LAB
HCT VFR BLD AUTO: 31 % (ref 40.4–52.2)
HGB BLD-MCNC: 9.6 G/DL (ref 13.7–17.6)

## 2017-12-21 PROCEDURE — 63510000001 EPOETIN ALFA PER 1000 UNITS: Performed by: INTERNAL MEDICINE

## 2017-12-21 PROCEDURE — 36415 COLL VENOUS BLD VENIPUNCTURE: CPT

## 2017-12-21 PROCEDURE — 96372 THER/PROPH/DIAG INJ SC/IM: CPT

## 2017-12-21 PROCEDURE — 85014 HEMATOCRIT: CPT | Performed by: INTERNAL MEDICINE

## 2017-12-21 PROCEDURE — 85018 HEMOGLOBIN: CPT | Performed by: INTERNAL MEDICINE

## 2017-12-21 RX ADMIN — ERYTHROPOIETIN 20000 UNITS: 20000 INJECTION, SOLUTION INTRAVENOUS; SUBCUTANEOUS at 15:19

## 2017-12-21 NOTE — PROGRESS NOTES
Procrit given per orders and parameters med. Pt tolerated injection well.  Pt dc'ed ambulatory at 1522.

## 2017-12-28 ENCOUNTER — HOSPITAL ENCOUNTER (OUTPATIENT)
Dept: INFUSION THERAPY | Facility: HOSPITAL | Age: 82
Discharge: HOME | End: 2017-12-28
Attending: INTERNAL MEDICINE | Admitting: INTERNAL MEDICINE

## 2017-12-28 ENCOUNTER — CLINICAL SUPPORT NO REQUIREMENTS (OUTPATIENT)
Dept: CARDIOLOGY | Facility: CLINIC | Age: 82
End: 2017-12-28

## 2017-12-28 VITALS
HEART RATE: 68 BPM | SYSTOLIC BLOOD PRESSURE: 111 MMHG | DIASTOLIC BLOOD PRESSURE: 55 MMHG | OXYGEN SATURATION: 98 % | RESPIRATION RATE: 16 BRPM | TEMPERATURE: 95.6 F

## 2017-12-28 DIAGNOSIS — D63.1 ANEMIA OF CHRONIC RENAL FAILURE, STAGE 4 (SEVERE) (HCC): ICD-10-CM

## 2017-12-28 DIAGNOSIS — N18.4 ANEMIA OF CHRONIC RENAL FAILURE, STAGE 4 (SEVERE) (HCC): ICD-10-CM

## 2017-12-28 DIAGNOSIS — I50.22 CHRONIC SYSTOLIC CONGESTIVE HEART FAILURE (HCC): Primary | ICD-10-CM

## 2017-12-28 PROCEDURE — 96374 THER/PROPH/DIAG INJ IV PUSH: CPT

## 2017-12-28 PROCEDURE — 25010000002 FERUMOXYTOL 510 MG/17ML SOLUTION 510 MG VIAL: Performed by: INTERNAL MEDICINE

## 2017-12-28 PROCEDURE — 96365 THER/PROPH/DIAG IV INF INIT: CPT

## 2017-12-28 PROCEDURE — 93294 REM INTERROG EVL PM/LDLS PM: CPT | Performed by: INTERNAL MEDICINE

## 2017-12-28 PROCEDURE — 93296 REM INTERROG EVL PM/IDS: CPT | Performed by: INTERNAL MEDICINE

## 2017-12-28 RX ORDER — FUROSEMIDE 20 MG/1
60 TABLET ORAL DAILY
COMMUNITY

## 2017-12-28 RX ADMIN — FERUMOXYTOL 510 MG: 510 INJECTION INTRAVENOUS at 14:42

## 2018-01-04 ENCOUNTER — HOSPITAL ENCOUNTER (OUTPATIENT)
Dept: INFUSION THERAPY | Facility: HOSPITAL | Age: 83
Discharge: HOME | End: 2018-01-04
Admitting: INTERNAL MEDICINE

## 2018-01-04 VITALS
TEMPERATURE: 97.6 F | RESPIRATION RATE: 16 BRPM | DIASTOLIC BLOOD PRESSURE: 65 MMHG | HEART RATE: 69 BPM | OXYGEN SATURATION: 99 % | SYSTOLIC BLOOD PRESSURE: 124 MMHG

## 2018-01-04 DIAGNOSIS — N18.4 ANEMIA OF CHRONIC RENAL FAILURE, STAGE 4 (SEVERE) (HCC): ICD-10-CM

## 2018-01-04 DIAGNOSIS — D63.1 ANEMIA OF CHRONIC RENAL FAILURE, STAGE 4 (SEVERE) (HCC): ICD-10-CM

## 2018-01-04 LAB
HCT VFR BLD AUTO: 33.6 % (ref 40.4–52.2)
HGB BLD-MCNC: 10.6 G/DL (ref 13.7–17.6)

## 2018-01-04 PROCEDURE — 85014 HEMATOCRIT: CPT | Performed by: INTERNAL MEDICINE

## 2018-01-04 PROCEDURE — 85018 HEMOGLOBIN: CPT | Performed by: INTERNAL MEDICINE

## 2018-01-04 PROCEDURE — 25010000002 FERUMOXYTOL 510 MG/17ML SOLUTION 510 MG VIAL: Performed by: INTERNAL MEDICINE

## 2018-01-04 PROCEDURE — 96372 THER/PROPH/DIAG INJ SC/IM: CPT

## 2018-01-04 PROCEDURE — 63510000001 EPOETIN ALFA PER 1000 UNITS: Performed by: INTERNAL MEDICINE

## 2018-01-04 PROCEDURE — 96365 THER/PROPH/DIAG IV INF INIT: CPT

## 2018-01-04 PROCEDURE — 36415 COLL VENOUS BLD VENIPUNCTURE: CPT

## 2018-01-04 RX ADMIN — FERUMOXYTOL 510 MG: 510 INJECTION INTRAVENOUS at 15:40

## 2018-01-04 RX ADMIN — ERYTHROPOIETIN 20000 UNITS: 20000 INJECTION, SOLUTION INTRAVENOUS; SUBCUTANEOUS at 15:33

## 2018-01-04 NOTE — PROGRESS NOTES
Pt tolerated infusion and injection well. Pt monitored for 30 min post infusion. VSS. Pt given AVS and dc'ed ambulatory at 1628.

## 2018-01-18 ENCOUNTER — APPOINTMENT (OUTPATIENT)
Dept: INFUSION THERAPY | Facility: HOSPITAL | Age: 83
End: 2018-01-18

## 2018-01-22 ENCOUNTER — OFFICE VISIT (OUTPATIENT)
Dept: SLEEP MEDICINE | Facility: HOSPITAL | Age: 83
End: 2018-01-22
Attending: INTERNAL MEDICINE

## 2018-01-22 ENCOUNTER — HOSPITAL ENCOUNTER (OUTPATIENT)
Dept: INFUSION THERAPY | Facility: HOSPITAL | Age: 83
Discharge: HOME | End: 2018-01-22
Admitting: INTERNAL MEDICINE

## 2018-01-22 VITALS
OXYGEN SATURATION: 94 % | HEART RATE: 79 BPM | DIASTOLIC BLOOD PRESSURE: 61 MMHG | TEMPERATURE: 96.5 F | SYSTOLIC BLOOD PRESSURE: 141 MMHG | RESPIRATION RATE: 16 BRPM

## 2018-01-22 VITALS
HEIGHT: 70 IN | DIASTOLIC BLOOD PRESSURE: 59 MMHG | SYSTOLIC BLOOD PRESSURE: 133 MMHG | BODY MASS INDEX: 25.34 KG/M2 | WEIGHT: 177 LBS | HEART RATE: 79 BPM

## 2018-01-22 DIAGNOSIS — D63.1 ANEMIA OF CHRONIC RENAL FAILURE, STAGE 4 (SEVERE) (HCC): ICD-10-CM

## 2018-01-22 DIAGNOSIS — N18.4 ANEMIA OF CHRONIC RENAL FAILURE, STAGE 4 (SEVERE) (HCC): ICD-10-CM

## 2018-01-22 DIAGNOSIS — Z99.89 OSA ON CPAP: Primary | ICD-10-CM

## 2018-01-22 DIAGNOSIS — G47.33 OSA ON CPAP: Primary | ICD-10-CM

## 2018-01-22 LAB
HCT VFR BLD AUTO: 40.6 % (ref 40.4–52.2)
HGB BLD-MCNC: 12.8 G/DL (ref 13.7–17.6)

## 2018-01-22 PROCEDURE — 36415 COLL VENOUS BLD VENIPUNCTURE: CPT

## 2018-01-22 PROCEDURE — 85014 HEMATOCRIT: CPT | Performed by: INTERNAL MEDICINE

## 2018-01-22 PROCEDURE — 85018 HEMOGLOBIN: CPT | Performed by: INTERNAL MEDICINE

## 2018-01-22 PROCEDURE — G0463 HOSPITAL OUTPT CLINIC VISIT: HCPCS

## 2018-01-22 NOTE — PROGRESS NOTES
Procrit not indicated due to parameters not met.  Pt notified and given AVS. Pt dc'ed ambulatory at 1355.

## 2018-01-22 NOTE — PROGRESS NOTES
"[facility] PULMONARY CARE         Dr Alex Kat  [unfilled]  Patient Care Team:  Riki Olivares MD as PCP - General (General Practice)    Chief Complaint: Follow-up XIMENA with underlying history of A. fib hypertension and pacemaker.  Initial studies showed AHI of 27 events per hour.    Interval History: Patient here for follow-up.  Currently set up of CPAP 10 cm.  AHI is 4.6 events per hour but leak is high at 2 hours 33 minutes.  Patient had tried different mask with no improvement.  Feels rested with the CPAP.  No alcohol no caffeine abuse.  Currently goes to bed 9:30 PM gets up 6:15 AM.  Feels rested with the CPAP.  Other medical issues including anemia and kidney functions worse.  Last mass change was 6 months ago.    REVIEW OF SYSTEMS:   CARDIOVASCULAR: No chest pain, chest pressure or chest discomfort. No palpitations or edema.   RESPIRATORY: No shortness of breath, cough or sputum.   GASTROINTESTINAL: No anorexia, nausea, vomiting or diarrhea. No abdominal pain or blood.   HEMATOLOGIC: No bleeding or bruising.   Positive for nasal congestion.  [place] 5 out of 24    Ventilator/Non-Invasive Ventilation Settings     None            Vital Signs  Heart Rate:  [79] 79  BP: (133)/(59) 133/59  [unfilled]  Flowsheet Rows         First Filed Value    Admission Height  177.8 cm (70\") Documented at 01/22/2018 0900    Admission Weight  80.3 kg (177 lb) Documented at 01/22/2018 0900          Physical Exam:   General Appearance:    Alert, cooperative, in no acute distress  ENT Mallampati between 3 and 4    Lungs:     Clear to auscultation,respirations regular, even and                  unlabored    Heart:    Regular rhythm and normal rate, normal S1 and S2, no            murmur, no gallop, no rub, no click   Chest Wall:    No abnormalities observed   Abdomen:     Normal bowel sounds, no masses, no organomegaly, soft        non-tender, non-distended, no guarding, no rebound                tenderness   Extremities:   " Moves all extremities well, no edema, no cyanosis, no             redness     Results Review:                                          I reviewed the patient's new clinical results.  I personally viewed and interpreted the patient's CXR        Medication Review:         No current facility-administered medications for this visit.     ASSESSMENT:   Obstructive sleep apnea syndrome comorbidities of A. fib hypertension and pacemaker     PLAN:  Reviewed download to the patient  Huge leak noted  Compliance and AHI acceptable  We'll do mask fitting today and the sleep lab  Sleep hygiene measures reinforced  Treat underlying comorbidities  Follow-up in 1 year    Alex Kat MD  01/22/18  11:28 AM

## 2018-02-03 ENCOUNTER — APPOINTMENT (OUTPATIENT)
Dept: CARDIOLOGY | Facility: HOSPITAL | Age: 83
End: 2018-02-03
Attending: EMERGENCY MEDICINE

## 2018-02-03 ENCOUNTER — APPOINTMENT (OUTPATIENT)
Dept: GENERAL RADIOLOGY | Facility: HOSPITAL | Age: 83
End: 2018-02-03

## 2018-02-03 ENCOUNTER — HOSPITAL ENCOUNTER (EMERGENCY)
Facility: HOSPITAL | Age: 83
Discharge: HOME | End: 2018-02-03
Attending: EMERGENCY MEDICINE | Admitting: EMERGENCY MEDICINE

## 2018-02-03 VITALS
WEIGHT: 175 LBS | OXYGEN SATURATION: 96 % | HEART RATE: 70 BPM | RESPIRATION RATE: 16 BRPM | HEIGHT: 75 IN | DIASTOLIC BLOOD PRESSURE: 61 MMHG | BODY MASS INDEX: 21.76 KG/M2 | SYSTOLIC BLOOD PRESSURE: 114 MMHG | TEMPERATURE: 96.9 F

## 2018-02-03 DIAGNOSIS — L03.115 CELLULITIS OF FOOT, RIGHT: ICD-10-CM

## 2018-02-03 DIAGNOSIS — M10.9 ACUTE GOUT OF RIGHT FOOT, UNSPECIFIED CAUSE: Primary | ICD-10-CM

## 2018-02-03 LAB
ALBUMIN SERPL-MCNC: 3.6 G/DL (ref 3.5–5.2)
ALBUMIN/GLOB SERPL: 0.8 G/DL
ALP SERPL-CCNC: 76 U/L (ref 39–117)
ALT SERPL W P-5'-P-CCNC: 10 U/L (ref 1–41)
ANION GAP SERPL CALCULATED.3IONS-SCNC: 11.4 MMOL/L
AST SERPL-CCNC: 15 U/L (ref 1–40)
BASOPHILS # BLD AUTO: 0.01 10*3/MM3 (ref 0–0.2)
BASOPHILS NFR BLD AUTO: 0.2 % (ref 0–1.5)
BH CV LOWER VASCULAR LEFT COMMON FEMORAL AUGMENT: NORMAL
BH CV LOWER VASCULAR LEFT COMMON FEMORAL COMPETENT: NORMAL
BH CV LOWER VASCULAR LEFT COMMON FEMORAL COMPRESS: NORMAL
BH CV LOWER VASCULAR LEFT COMMON FEMORAL PHASIC: NORMAL
BH CV LOWER VASCULAR LEFT COMMON FEMORAL SPONT: NORMAL
BH CV LOWER VASCULAR RIGHT COMMON FEMORAL AUGMENT: NORMAL
BH CV LOWER VASCULAR RIGHT COMMON FEMORAL COMPETENT: NORMAL
BH CV LOWER VASCULAR RIGHT COMMON FEMORAL COMPRESS: NORMAL
BH CV LOWER VASCULAR RIGHT COMMON FEMORAL PHASIC: NORMAL
BH CV LOWER VASCULAR RIGHT COMMON FEMORAL SPONT: NORMAL
BH CV LOWER VASCULAR RIGHT DISTAL FEMORAL COMPRESS: NORMAL
BH CV LOWER VASCULAR RIGHT GASTRONEMIUS COMPRESS: NORMAL
BH CV LOWER VASCULAR RIGHT GREATER SAPH AK COMPRESS: NORMAL
BH CV LOWER VASCULAR RIGHT GREATER SAPH BK COMPRESS: NORMAL
BH CV LOWER VASCULAR RIGHT MID FEMORAL AUGMENT: NORMAL
BH CV LOWER VASCULAR RIGHT MID FEMORAL COMPETENT: NORMAL
BH CV LOWER VASCULAR RIGHT MID FEMORAL COMPRESS: NORMAL
BH CV LOWER VASCULAR RIGHT MID FEMORAL PHASIC: NORMAL
BH CV LOWER VASCULAR RIGHT MID FEMORAL SPONT: NORMAL
BH CV LOWER VASCULAR RIGHT PERONEAL COMPRESS: NORMAL
BH CV LOWER VASCULAR RIGHT POPLITEAL AUGMENT: NORMAL
BH CV LOWER VASCULAR RIGHT POPLITEAL COMPETENT: NORMAL
BH CV LOWER VASCULAR RIGHT POPLITEAL COMPRESS: NORMAL
BH CV LOWER VASCULAR RIGHT POPLITEAL PHASIC: NORMAL
BH CV LOWER VASCULAR RIGHT POPLITEAL SPONT: NORMAL
BH CV LOWER VASCULAR RIGHT POSTERIOR TIBIAL COMPRESS: NORMAL
BH CV LOWER VASCULAR RIGHT PROXIMAL FEMORAL COMPRESS: NORMAL
BH CV LOWER VASCULAR RIGHT SAPHENOFEMORAL JUNCTION AUGMENT: NORMAL
BH CV LOWER VASCULAR RIGHT SAPHENOFEMORAL JUNCTION COMPETENT: NORMAL
BH CV LOWER VASCULAR RIGHT SAPHENOFEMORAL JUNCTION COMPRESS: NORMAL
BH CV LOWER VASCULAR RIGHT SAPHENOFEMORAL JUNCTION PHASIC: NORMAL
BH CV LOWER VASCULAR RIGHT SAPHENOFEMORAL JUNCTION SPONT: NORMAL
BILIRUB SERPL-MCNC: 0.7 MG/DL (ref 0.1–1.2)
BUN BLD-MCNC: 36 MG/DL (ref 8–23)
BUN/CREAT SERPL: 14.1 (ref 7–25)
CALCIUM SPEC-SCNC: 8.7 MG/DL (ref 8.6–10.5)
CHLORIDE SERPL-SCNC: 101 MMOL/L (ref 98–107)
CO2 SERPL-SCNC: 25.6 MMOL/L (ref 22–29)
CREAT BLD-MCNC: 2.56 MG/DL (ref 0.76–1.27)
CRP SERPL-MCNC: 2.04 MG/DL (ref 0–0.5)
D DIMER PPP FEU-MCNC: 0.86 MCGFEU/ML (ref 0–0.49)
DEPRECATED RDW RBC AUTO: 49.3 FL (ref 37–54)
EOSINOPHIL # BLD AUTO: 0.08 10*3/MM3 (ref 0–0.7)
EOSINOPHIL NFR BLD AUTO: 1.5 % (ref 0.3–6.2)
ERYTHROCYTE [DISTWIDTH] IN BLOOD BY AUTOMATED COUNT: 14.5 % (ref 11.5–14.5)
ERYTHROCYTE [SEDIMENTATION RATE] IN BLOOD: 33 MM/HR (ref 0–20)
GFR SERPL CREATININE-BSD FRML MDRD: 24 ML/MIN/1.73
GLOBULIN UR ELPH-MCNC: 4.6 GM/DL
GLUCOSE BLD-MCNC: 127 MG/DL (ref 65–99)
HCT VFR BLD AUTO: 38 % (ref 40.4–52.2)
HGB BLD-MCNC: 11.9 G/DL (ref 13.7–17.6)
IMM GRANULOCYTES # BLD: 0 10*3/MM3 (ref 0–0.03)
IMM GRANULOCYTES NFR BLD: 0 % (ref 0–0.5)
INR PPP: 1.28 (ref 0.9–1.1)
LYMPHOCYTES # BLD AUTO: 0.81 10*3/MM3 (ref 0.9–4.8)
LYMPHOCYTES NFR BLD AUTO: 14.8 % (ref 19.6–45.3)
MCH RBC QN AUTO: 29 PG (ref 27–32.7)
MCHC RBC AUTO-ENTMCNC: 31.3 G/DL (ref 32.6–36.4)
MCV RBC AUTO: 92.7 FL (ref 79.8–96.2)
MONOCYTES # BLD AUTO: 0.38 10*3/MM3 (ref 0.2–1.2)
MONOCYTES NFR BLD AUTO: 7 % (ref 5–12)
NEUTROPHILS # BLD AUTO: 4.18 10*3/MM3 (ref 1.9–8.1)
NEUTROPHILS NFR BLD AUTO: 76.5 % (ref 42.7–76)
PLATELET # BLD AUTO: 170 10*3/MM3 (ref 140–500)
PMV BLD AUTO: 9.5 FL (ref 6–12)
POTASSIUM BLD-SCNC: 4.2 MMOL/L (ref 3.5–5.2)
PROCALCITONIN SERPL-MCNC: 0.11 NG/ML (ref 0.1–0.25)
PROT SERPL-MCNC: 8.2 G/DL (ref 6–8.5)
PROTHROMBIN TIME: 15.5 SECONDS (ref 11.7–14.2)
RBC # BLD AUTO: 4.1 10*6/MM3 (ref 4.6–6)
SODIUM BLD-SCNC: 138 MMOL/L (ref 136–145)
URATE SERPL-MCNC: 6.8 MG/DL (ref 3.4–7)
WBC NRBC COR # BLD: 5.46 10*3/MM3 (ref 4.5–10.7)

## 2018-02-03 PROCEDURE — 99284 EMERGENCY DEPT VISIT MOD MDM: CPT

## 2018-02-03 PROCEDURE — 25010000002 ONDANSETRON PER 1 MG: Performed by: EMERGENCY MEDICINE

## 2018-02-03 PROCEDURE — 84145 PROCALCITONIN (PCT): CPT | Performed by: EMERGENCY MEDICINE

## 2018-02-03 PROCEDURE — 96375 TX/PRO/DX INJ NEW DRUG ADDON: CPT

## 2018-02-03 PROCEDURE — 84550 ASSAY OF BLOOD/URIC ACID: CPT | Performed by: EMERGENCY MEDICINE

## 2018-02-03 PROCEDURE — 86140 C-REACTIVE PROTEIN: CPT | Performed by: EMERGENCY MEDICINE

## 2018-02-03 PROCEDURE — 85025 COMPLETE CBC W/AUTO DIFF WBC: CPT | Performed by: EMERGENCY MEDICINE

## 2018-02-03 PROCEDURE — 73630 X-RAY EXAM OF FOOT: CPT

## 2018-02-03 PROCEDURE — 85652 RBC SED RATE AUTOMATED: CPT | Performed by: EMERGENCY MEDICINE

## 2018-02-03 PROCEDURE — 96374 THER/PROPH/DIAG INJ IV PUSH: CPT

## 2018-02-03 PROCEDURE — 85379 FIBRIN DEGRADATION QUANT: CPT | Performed by: EMERGENCY MEDICINE

## 2018-02-03 PROCEDURE — 36415 COLL VENOUS BLD VENIPUNCTURE: CPT

## 2018-02-03 PROCEDURE — 85610 PROTHROMBIN TIME: CPT | Performed by: EMERGENCY MEDICINE

## 2018-02-03 PROCEDURE — 93971 EXTREMITY STUDY: CPT

## 2018-02-03 PROCEDURE — 80053 COMPREHEN METABOLIC PANEL: CPT | Performed by: EMERGENCY MEDICINE

## 2018-02-03 RX ORDER — HYDROCODONE BITARTRATE AND ACETAMINOPHEN 7.5; 325 MG/1; MG/1
1 TABLET ORAL EVERY 4 HOURS PRN
Qty: 20 TABLET | Refills: 0 | Status: SHIPPED | OUTPATIENT
Start: 2018-02-03

## 2018-02-03 RX ORDER — ONDANSETRON 2 MG/ML
4 INJECTION INTRAMUSCULAR; INTRAVENOUS ONCE
Status: COMPLETED | OUTPATIENT
Start: 2018-02-03 | End: 2018-02-03

## 2018-02-03 RX ORDER — HYDROMORPHONE HYDROCHLORIDE 1 MG/ML
0.5 INJECTION, SOLUTION INTRAMUSCULAR; INTRAVENOUS; SUBCUTANEOUS ONCE
Status: COMPLETED | OUTPATIENT
Start: 2018-02-03 | End: 2018-02-03

## 2018-02-03 RX ORDER — LEVOFLOXACIN 250 MG/1
250 TABLET ORAL DAILY
COMMUNITY
End: 2018-03-20

## 2018-02-03 RX ORDER — METHYLPREDNISOLONE 4 MG/1
TABLET ORAL
Qty: 21 EACH | Refills: 0 | Status: SHIPPED | OUTPATIENT
Start: 2018-02-03 | End: 2018-03-06

## 2018-02-03 RX ADMIN — ONDANSETRON 4 MG: 2 INJECTION INTRAMUSCULAR; INTRAVENOUS at 12:36

## 2018-02-03 RX ADMIN — HYDROMORPHONE HYDROCHLORIDE 0.5 MG: 10 INJECTION INTRAMUSCULAR; INTRAVENOUS; SUBCUTANEOUS at 12:37

## 2018-02-03 NOTE — ED PROVIDER NOTES
EMERGENCY DEPARTMENT ENCOUNTER    CHIEF COMPLAINT  Chief Complaint: Foot pain  History given by: Patient  History limited by: Nothing  Room Number: 12/12  PMD: Riki Olivares MD      HPI:  Pt is a 89 y.o. male who presents to the ED complaining of foot pain that began yesterday and became worse last night. Pt states that he noticed this morning that his R foot was erythematous and swollen. Pt denies fever or history of gout. Pt states that his PMD started him on Levaquin every other day on 1/31/18 for URI symptoms (cough and congestion) and that he took his second dose of Levaquin yesterday. Pt states that his cough and congestion have improved since he began the levaquin    Duration:  1 day  Onset: Gradual  Timing: Constant  Location: Right foot  Radiation: None  Quality: Pain   Intensity/Severity: Moderate  Progression: Worsening  Associated Symptoms: Swelling, erythema, cough, congestion  Aggravating Factors: Palpation   Alleviating Factors: None  Previous Episodes: None  Treatment before arrival: Pt states that his PMD started him on Levaquin every other day on 1/31/18 for URI symptoms (cough and congestion)     PAST MEDICAL HISTORY  Active Ambulatory Problems     Diagnosis Date Noted   • UTI (urinary tract infection) 09/10/2016   • Back ache 09/13/2016   • Bleeding 09/13/2016   • Abnormal bruising 09/13/2016   • Accumulation of fluid in tissues 09/13/2016   • Fatigue 09/13/2016   • Difficulty hearing 09/13/2016   • Hoarse 09/13/2016   • Inguinal hernia, right 09/13/2016   • Irregular cardiac rhythm 09/13/2016   • Itch of skin 09/13/2016   • Decreased motor strength 09/13/2016   • Abnormal loss of weight 09/13/2016   • Chronic atrial fibrillation 09/14/2016   • Status post biventricular pacemaker 09/14/2016   • History of non-ischemic cardiomyopathy 09/14/2016   • Hypersomnia    • Unilateral inguinal hernia without obstruction or gangrene 10/10/2016   • Non-rheumatic mitral regurgitation 03/21/2017   •  Cardiac resynchronization therapy pacemaker (CRT-P) in place 09/22/2017   • Anemia of chronic renal failure 12/06/2017     Resolved Ambulatory Problems     Diagnosis Date Noted   • No Resolved Ambulatory Problems     Past Medical History:   Diagnosis Date   • Abnormal ECG 1979   • AICD (automatic cardioverter/defibrillator) present    • Anemia of chronic renal failure 12/6/2017   • Atrial fibrillation    • AV block    • Blockage of kidney vein    • BPH (benign prostatic hypertrophy)    • Cardiac arrest    • Cardiomyopathy    • CHF (congestive heart failure)    • Chronic kidney disease    • Clotting disorder    • Coronary artery disease    • Disease of thyroid gland    • Diverticulitis of colon    • Diverticulosis    • Edema    • Gallbladder disease    • GI (gastrointestinal bleed)    • Health care maintenance    • Hearing loss of both ears    • Heart disease    • Heart failure    • History of benign prostatic hypertrophy    • History of cataract    • History of heart failure    • History of hypertension    • History of prostatitis    • Hoarseness    • Hypertension    • Inguinal hernia    • Irregular heart rate    • Itchy skin    • Lupus    • Muscle weakness    • Prostatitis    • SLE (systemic lupus erythematosus) 1980   • Sleep apnea    • Systemic lupus erythematosus    • Thyroid disease    • VT (ventricular tachycardia)    • Wears glasses    • Weight loss        PAST SURGICAL HISTORY  Past Surgical History:   Procedure Laterality Date   • CARDIAC PACEMAKER PLACEMENT  10/30/2015    [facility] [facility] PAC[facility]AK[facility]-DR. REINA KATZ-LEFT CHEST   • CATARACT EXTRACTION     • CHOLECYSTECTOMY  1995   • CORONARY ANGIOPLASTY     • CYSTOSCOPY TRANSURETHRAL RESECTION OF PROSTATE      X 2   • INGUINAL HERNIA REPAIR Right 10/10/2016    Procedure: LAPAROSCOPIC RIGHT INGUINAL HERNIA  REPAIR WITH MESH;  Surgeon: Saad Cardenas MD;  Location: [facility];  Service:    • NOSE SURGERY     • PACEMAKER IMPLANTATION       RIGHT CHEST-NONFUNCTIONING       FAMILY HISTORY  Family History   Problem Relation Age of Onset   • Heart disease Mother      Cancer & Heart   • Cancer Mother      vaginal   • Heart disease Brother      pacemaker   • Lung cancer Brother    • Lung cancer Father    • Alcohol abuse Father    • Heart disease Brother      Cancer       SOCIAL HISTORY  Social History     Social History   • Marital status:      Spouse name: N/A   • Number of children: 5   • Years of education: N/A     Occupational History   • Retired      Social History Main Topics   • Smoking status: Former Smoker     Packs/day: 0.25     Years: 0.50     Start date: 9/1/1953     Quit date: 1953   • Smokeless tobacco: Never Used   • Alcohol use Yes     0 Glasses of wine, 0 Cans of beer, 0 Shots of liquor, 0 Standard drinks or equivalent per week      Comment: Beer with boiled seafood; wine S TOASTS   • Drug use: No   • Sexual activity: No     Other Topics Concern   • Not on file     Social History Narrative       ALLERGIES  Disopyramide; Latex; Norpace [disopyramide phosphate]; Procainamide; Quinidine; Cobalt; and Penicillins    REVIEW OF SYSTEMS  Review of Systems   Constitutional: Negative for activity change, appetite change and fever.   HENT: Positive for congestion. Negative for sore throat.    Eyes: Negative.    Respiratory: Positive for cough. Negative for shortness of breath.    Cardiovascular: Negative for chest pain.   Gastrointestinal: Negative for abdominal pain, diarrhea and vomiting.   Endocrine: Negative.    Genitourinary: Negative for decreased urine volume and dysuria.   Musculoskeletal: Positive for arthralgias (right foot). Negative for neck pain.        Right foot swelling    Skin: Positive for color change (Erythema to right foot). Negative for rash and wound.   Allergic/Immunologic: Negative.    Neurological: Negative for weakness, numbness and headaches.   Hematological: Negative.    Psychiatric/Behavioral: Negative.    All  other systems reviewed and are negative.      PHYSICAL EXAM  ED Triage Vitals   Temp Heart Rate Resp BP SpO2   02/03/18 0848 02/03/18 0848 02/03/18 0848 02/03/18 0903 02/03/18 0848   96.9 °F (36.1 °C) 73 15 136/58 92 %      Temp src Heart Rate Source Patient Position BP Location FiO2 (%)   02/03/18 0848 02/03/18 0848 02/03/18 0903 02/03/18 0903 --   Tympanic Monitor Lying Left arm        Physical Exam   Constitutional: He is oriented to person, place, and time and well-developed, well-nourished, and in no distress.   HENT:   Head: Normocephalic and atraumatic.   Eyes: EOM are normal. Pupils are equal, round, and reactive to light.   Neck: Normal range of motion. Neck supple.   Cardiovascular: Normal rate, regular rhythm and normal heart sounds.    Pulmonary/Chest: Effort normal and breath sounds normal. No respiratory distress.   Abdominal: Soft. Bowel sounds are normal. He exhibits no distension. There is no tenderness. There is no rebound and no guarding.   Musculoskeletal: Normal range of motion. He exhibits tenderness. He exhibits no edema.   tenderness, swelling and erythema in R great toe and R MTP that extends medially about 5cm on his R foot. Mild bilateral calf tenderness   Neurological: He is alert and oriented to person, place, and time. He has normal sensation and normal strength.   Skin: Skin is warm and dry. There is erythema.   Psychiatric: Mood and affect normal.   Nursing note and vitals reviewed.      LAB RESULTS  Lab Results (last 24 hours)     Procedure Component Value Units Date/Time    CBC & Differential [[id number]] Collected:  02/03/18 0928    Specimen:  Blood Updated:  02/03/18 0958    Narrative:       The following orders were created for panel order CBC & Differential.  Procedure                               Abnormality         Status                     ---------                               -----------         ------                     CBC Auto Differential[[id number]]        Abnormal             Final result                 Please view results for these tests on the individual orders.    Comprehensive Metabolic Panel [[id number]]  (Abnormal) Collected:  02/03/18 0928    Specimen:  Blood Updated:  02/03/18 1013     Glucose 127 (H) mg/dL      BUN 36 (H) mg/dL      Creatinine 2.56 (H) mg/dL      Sodium 138 mmol/L      Potassium 4.2 mmol/L      Chloride 101 mmol/L      CO2 25.6 mmol/L      Calcium 8.7 mg/dL      Total Protein 8.2 g/dL      Albumin 3.60 g/dL      ALT (SGPT) 10 U/L      AST (SGOT) 15 U/L      Alkaline Phosphatase 76 U/L      Total Bilirubin 0.7 mg/dL      eGFR Non African Amer 24 (L) mL/min/1.73      Globulin 4.6 gm/dL      A/G Ratio 0.8 g/dL      BUN/Creatinine Ratio 14.1     Anion Gap 11.4 mmol/L     Narrative:       The MDRD GFR formula is only valid for adults with stable renal function between ages 18 and 70.    Protime-INR [[id number]]  (Abnormal) Collected:  02/03/18 0928    Specimen:  Blood Updated:  02/03/18 1001     Protime 15.5 (H) Seconds      INR 1.28 (H)    D-dimer, Quantitative [[id number]]  (Abnormal) Collected:  02/03/18 0928    Specimen:  Blood Updated:  02/03/18 1001     D-Dimer, Quantitative 0.86 (H) MCGFEU/mL     Narrative:       The Stago D-Dimer test used in conjunction with a clinical pretest probability (PTP) assessment model, has been approved by the FDA to rule out the presence of venous thromboembolism (VTE) in outpatients suspected of deep venous thrombosis (DVT) or pulmonary embolism (PE).     Procalcitonin [[id number]]  (Normal) Collected:  02/03/18 0928    Specimen:  Blood Updated:  02/03/18 1018     Procalcitonin 0.11 ng/mL     Narrative:       As a Marker for Sepsis (Non-Neonates):   1. <0.5 ng/mL represents a low risk of severe sepsis and/or septic shock.  1. >2 ng/mL represents a high risk of severe sepsis and/or septic shock.    As a Marker for Lower Respiratory Tract Infections that require antibiotic therapy:  PCT on Admission     Antibiotic Therapy      "        6-12 Hrs later  > 0.5                Strongly Recommended            >0.25 - <0.5         Recommended  0.1 - 0.25           Discouraged                   Remeasure/reassess PCT  <0.1                 Strongly Discouraged          Remeasure/reassess PCT      As 28 day mortality risk marker: \"Change in Procalcitonin Result\" (> 80 % or <=80 %) if Day 0 (or Day 1) and Day 4 values are available. Refer to http://www.[email or web address] [email or web address]pct-calculator.com/   Change in PCT <=80 %   A decrease of PCT levels below or equal to 80 % defines a positive change in PCT test result representing a higher risk for 28-day all-cause mortality of patients diagnosed with severe sepsis or septic shock.  Change in PCT > 80 %   A decrease of PCT levels of more than 80 % defines a negative change in PCT result representing a lower risk for 28-day all-cause mortality of patients diagnosed with severe sepsis or septic shock.                Sedimentation Rate [[id number]]  (Abnormal) Collected:  02/03/18 0928    Specimen:  Blood Updated:  02/03/18 1025     Sed Rate 33 (H) mm/hr     C-reactive Protein [[id number]]  (Abnormal) Collected:  02/03/18 0928    Specimen:  Blood Updated:  02/03/18 1013     C-Reactive Protein 2.04 (H) mg/dL     Uric Acid [[id number]]  (Normal) Collected:  02/03/18 0928    Specimen:  Blood Updated:  02/03/18 1013     Uric Acid 6.8 mg/dL     CBC Auto Differential [[id number]]  (Abnormal) Collected:  02/03/18 0928    Specimen:  Blood Updated:  02/03/18 0958     WBC 5.46 10*3/mm3      RBC 4.10 (L) 10*6/mm3      Hemoglobin 11.9 (L) g/dL      Hematocrit 38.0 (L) %      MCV 92.7 fL      MCH 29.0 pg      MCHC 31.3 (L) g/dL      RDW 14.5 %      RDW-SD 49.3 fl      MPV 9.5 fL      Platelets 170 10*3/mm3      Neutrophil % 76.5 (H) %      Lymphocyte % 14.8 (L) %      Monocyte % 7.0 %      Eosinophil % 1.5 %      Basophil % 0.2 %      Immature Grans % 0.0 %      Neutrophils, Absolute 4.18 10*3/mm3      Lymphocytes, Absolute 0.81 (L) 10*3/mm3  "     Monocytes, Absolute 0.38 10*3/mm3      Eosinophils, Absolute 0.08 10*3/mm3      Basophils, Absolute 0.01 10*3/mm3      Immature Grans, Absolute 0.00 10*3/mm3           I ordered the above labs and reviewed the results    RADIOLOGY  XR Foot 3+ View Right   Final Result       Mild soft tissue swelling. No acute fracture or erosion is identified.   If there is clinical suspicion for radiographically occult   osteomyelitis, further evaluation with MRI or bone scan could be   considered.               This report was finalized on 2/3/2018 10:16 AM by Dr. Arthur Montano MD.          1319 Reviewed pt's RLE venus doppler, which is negative for DVT. Independently viewed by me. Interpreted by radiologist.     I ordered the above noted radiological studies. Interpreted by radiologist. Reviewed by me in PACS.       PROCEDURES  Procedures      PROGRESS AND CONSULTS  ED Course   0917 Discussed the plan to order lab work for further evaluation. Pt declines pain medication at this time. all questions addressed. Pt understands and agrees with the plan, all questions answered.    0919 Ordered blood work, Uric Acid, C-reactive Protein, Sedimentation Rate, Procalcitonin, D-dimer, Quantitative, Protime-INR, and right foot XR for further evaluation.     1007 Ordered RLE venous doppler to rule out DVT.    1219 Per RN, the pt is requesting pain medication. Ordered Dilaudid and Zofran for pain.    1333 Placed call to Dr. Olivares (PMD) for consult.    1400 Discussed the case with Dr. Olivares (PMD), who requests that I continue the pt's Levaquin and requests I prescribe Medrol DosePak for likely gout, which I will do.    1403 Rechecked patient who is resting comfortably. Discussed all lab and imaging results and diagnosis of gout and cellulitis with patient. Discussed the consult with Dr. Olivares and the plan to discharge the pt home with prescriptions for pain medication and Medrol DosePak. I instructed the patient to f/u with Dr. Olivares  on 2/5/18 and to continue taking his Levaquin, as directed. Pt understands and agrees with the plan, all questions answered.        MEDICAL DECISION MAKING  Results were reviewed/discussed with the patient and they were also made aware of online access. Pt also made aware that some labs, such as cultures, will not be resulted during ER visit and follow up with PMD is necessary.     MDM  Number of Diagnoses or Management Options  Acute gout of right foot, unspecified cause:   Cellulitis of foot, right:      Amount and/or Complexity of Data Reviewed  Clinical lab tests: ordered and reviewed (D-dimer=0.86, Sed rate=33, CRP=2.04, WBC=5.46)  Tests in the radiology section of CPT®: ordered and reviewed (Right foot XR shows mild soft tissue swelling but no acute fracture.)  Decide to obtain previous medical records or to obtain history from someone other than the patient: yes  Review and summarize past medical records: yes (Pt saw Dr. Alex (pulmonology) for sleep apnea on 1/22/18.)  Discuss the patient with other providers: yes (D/w Dr. Olivares (PMD))  Independent visualization of images, tracings, or specimens: yes    Patient Progress  Patient progress: stable         DIAGNOSIS  Final diagnoses:   Cellulitis of foot, right   Acute gout of right foot, unspecified cause       DISPOSITION  DISCHARGE    Patient discharged in stable condition.    Reviewed implications of results, diagnosis, meds, responsibility to follow up, warning signs and symptoms of possible worsening, potential complications and reasons to return to ER, including fever, worsening pain, or any concerns.    Patient/Family voiced understanding of above instructions.    Discussed plan for discharge, as there is no emergent indication for admission.  Pt/family is agreeable and understands need for follow up and repeat testing.  Pt is aware that discharge does not mean that nothing is wrong but it indicates no emergency is present that requires admission and  they must continue care with follow-up as given below or physician of their choice.     FOLLOW-UP  Riki Olivares MD  [address] [place] [address]  [phone]    Call on 2/5/2018  to notify Dr. Olivares of how you are feeling         Medication List      New Prescriptions          HYDROcodone-acetaminophen 7.5-325 MG per tablet   Commonly known as:  NORCO   Take 1 tablet by mouth Every 4 (Four) Hours As Needed for Moderate Pain .       MethylPREDNISolone 4 MG tablet   Commonly known as:  MEDROL (GUTIERREZ)   Take as directed on package instructions.               Latest Documented Vital Signs:  As of 3:02 PM  BP- 114/61 HR- 70 Temp- 96.9 °F (36.1 °C) (Tympanic) O2 sat- 96%    --  Documentation assistance provided by krishna Em and Eleni Oliver for Dr. Carlisle.  Information recorded by the scribe was done at my direction and has been verified and validated by me.      Eleni Oliver  02/03/18 1434       Khadijah Em  02/03/18 1500       Edvin Carlisle MD  02/03/18 3147

## 2018-02-05 ENCOUNTER — HOSPITAL ENCOUNTER (OUTPATIENT)
Dept: INFUSION THERAPY | Facility: HOSPITAL | Age: 83
Discharge: HOME | End: 2018-02-05

## 2018-02-19 ENCOUNTER — HOSPITAL ENCOUNTER (OUTPATIENT)
Dept: INFUSION THERAPY | Facility: HOSPITAL | Age: 83
Discharge: HOME | End: 2018-02-19

## 2018-02-20 ENCOUNTER — HOSPITAL ENCOUNTER (OUTPATIENT)
Dept: INFUSION THERAPY | Facility: HOSPITAL | Age: 83
Discharge: HOME | End: 2018-02-20
Admitting: INTERNAL MEDICINE

## 2018-02-20 VITALS
TEMPERATURE: 98.1 F | OXYGEN SATURATION: 99 % | RESPIRATION RATE: 18 BRPM | HEART RATE: 83 BPM | SYSTOLIC BLOOD PRESSURE: 131 MMHG | DIASTOLIC BLOOD PRESSURE: 66 MMHG

## 2018-02-20 DIAGNOSIS — N18.4 ANEMIA OF CHRONIC RENAL FAILURE, STAGE 4 (SEVERE) (HCC): ICD-10-CM

## 2018-02-20 DIAGNOSIS — D63.1 ANEMIA OF CHRONIC RENAL FAILURE, STAGE 4 (SEVERE) (HCC): ICD-10-CM

## 2018-02-20 LAB
HCT VFR BLD AUTO: 36.3 % (ref 40.4–52.2)
HGB BLD-MCNC: 11.6 G/DL (ref 13.7–17.6)

## 2018-02-20 PROCEDURE — 85018 HEMOGLOBIN: CPT | Performed by: INTERNAL MEDICINE

## 2018-02-20 PROCEDURE — 36415 COLL VENOUS BLD VENIPUNCTURE: CPT

## 2018-02-20 PROCEDURE — G0463 HOSPITAL OUTPT CLINIC VISIT: HCPCS

## 2018-02-20 PROCEDURE — 85014 HEMATOCRIT: CPT | Performed by: INTERNAL MEDICINE

## 2018-02-20 NOTE — PROGRESS NOTES
Procrit not indicated due to parameters not met. Patient notified and given AVS. Patient ambulatory, D/C'd from [facility] at 1400.

## 2018-03-06 ENCOUNTER — HOSPITAL ENCOUNTER (OUTPATIENT)
Dept: INFUSION THERAPY | Facility: HOSPITAL | Age: 83
Discharge: HOME | End: 2018-03-06
Admitting: INTERNAL MEDICINE

## 2018-03-06 VITALS
DIASTOLIC BLOOD PRESSURE: 67 MMHG | HEART RATE: 76 BPM | SYSTOLIC BLOOD PRESSURE: 129 MMHG | OXYGEN SATURATION: 100 % | TEMPERATURE: 97.4 F | RESPIRATION RATE: 16 BRPM

## 2018-03-06 DIAGNOSIS — D63.1 ANEMIA OF CHRONIC RENAL FAILURE, STAGE 4 (SEVERE) (HCC): ICD-10-CM

## 2018-03-06 DIAGNOSIS — N18.4 ANEMIA OF CHRONIC RENAL FAILURE, STAGE 4 (SEVERE) (HCC): ICD-10-CM

## 2018-03-06 LAB
25(OH)D3 SERPL-MCNC: 51.3 NG/ML (ref 30–100)
ALBUMIN SERPL-MCNC: 3.5 G/DL (ref 3.5–5.2)
ALBUMIN/GLOB SERPL: 1 G/DL
ALP SERPL-CCNC: 80 U/L (ref 39–117)
ALT SERPL W P-5'-P-CCNC: 15 U/L (ref 1–41)
ANION GAP SERPL CALCULATED.3IONS-SCNC: 11.2 MMOL/L
AST SERPL-CCNC: 16 U/L (ref 1–40)
BILIRUB SERPL-MCNC: 0.5 MG/DL (ref 0.1–1.2)
BUN BLD-MCNC: 34 MG/DL (ref 8–23)
BUN/CREAT SERPL: 14.1 (ref 7–25)
CALCIUM SPEC-SCNC: 8.7 MG/DL (ref 8.6–10.5)
CALCIUM SPEC-SCNC: 8.8 MG/DL (ref 8.6–10.5)
CHLORIDE SERPL-SCNC: 104 MMOL/L (ref 98–107)
CO2 SERPL-SCNC: 26.8 MMOL/L (ref 22–29)
CREAT BLD-MCNC: 2.41 MG/DL (ref 0.76–1.27)
DEPRECATED RDW RBC AUTO: 54.6 FL (ref 37–54)
ERYTHROCYTE [DISTWIDTH] IN BLOOD BY AUTOMATED COUNT: 16.1 % (ref 11.5–14.5)
FERRITIN SERPL-MCNC: 273.8 NG/ML (ref 30–400)
FOLATE SERPL-MCNC: >20 NG/ML (ref 4.78–24.2)
GFR SERPL CREATININE-BSD FRML MDRD: 25 ML/MIN/1.73
GLOBULIN UR ELPH-MCNC: 3.4 GM/DL
GLUCOSE BLD-MCNC: 94 MG/DL (ref 65–99)
HCT VFR BLD AUTO: 38.3 % (ref 40.4–52.2)
HGB BLD-MCNC: 12 G/DL (ref 13.7–17.6)
IRON 24H UR-MRATE: 75 MCG/DL (ref 59–158)
IRON SATN MFR SERPL: 24 % (ref 20–50)
MCH RBC QN AUTO: 29 PG (ref 27–32.7)
MCHC RBC AUTO-ENTMCNC: 31.3 G/DL (ref 32.6–36.4)
MCV RBC AUTO: 92.5 FL (ref 79.8–96.2)
PHOSPHATE SERPL-MCNC: 3.9 MG/DL (ref 2.5–4.5)
PLATELET # BLD AUTO: 136 10*3/MM3 (ref 140–500)
PMV BLD AUTO: 9.4 FL (ref 6–12)
POTASSIUM BLD-SCNC: 4.4 MMOL/L (ref 3.5–5.2)
PROT SERPL-MCNC: 6.9 G/DL (ref 6–8.5)
PTH-INTACT SERPL-MCNC: 79.4 PG/ML (ref 15–65)
RBC # BLD AUTO: 4.14 10*6/MM3 (ref 4.6–6)
SODIUM BLD-SCNC: 142 MMOL/L (ref 136–145)
TIBC SERPL-MCNC: 307 MCG/DL (ref 298–536)
TRANSFERRIN SERPL-MCNC: 206 MG/DL (ref 200–360)
URATE SERPL-MCNC: 5.3 MG/DL (ref 3.4–7)
VIT B12 BLD-MCNC: 669 PG/ML (ref 211–946)
WBC NRBC COR # BLD: 3.97 10*3/MM3 (ref 4.5–10.7)

## 2018-03-06 PROCEDURE — 85027 COMPLETE CBC AUTOMATED: CPT | Performed by: INTERNAL MEDICINE

## 2018-03-06 PROCEDURE — 36415 COLL VENOUS BLD VENIPUNCTURE: CPT

## 2018-03-06 PROCEDURE — 84466 ASSAY OF TRANSFERRIN: CPT | Performed by: INTERNAL MEDICINE

## 2018-03-06 PROCEDURE — 83970 ASSAY OF PARATHORMONE: CPT | Performed by: INTERNAL MEDICINE

## 2018-03-06 PROCEDURE — 84100 ASSAY OF PHOSPHORUS: CPT | Performed by: INTERNAL MEDICINE

## 2018-03-06 PROCEDURE — G0463 HOSPITAL OUTPT CLINIC VISIT: HCPCS

## 2018-03-06 PROCEDURE — 82607 VITAMIN B-12: CPT | Performed by: INTERNAL MEDICINE

## 2018-03-06 PROCEDURE — 82306 VITAMIN D 25 HYDROXY: CPT | Performed by: INTERNAL MEDICINE

## 2018-03-06 PROCEDURE — 83540 ASSAY OF IRON: CPT | Performed by: INTERNAL MEDICINE

## 2018-03-06 PROCEDURE — 82728 ASSAY OF FERRITIN: CPT | Performed by: INTERNAL MEDICINE

## 2018-03-06 PROCEDURE — 80053 COMPREHEN METABOLIC PANEL: CPT | Performed by: INTERNAL MEDICINE

## 2018-03-06 PROCEDURE — 82746 ASSAY OF FOLIC ACID SERUM: CPT | Performed by: INTERNAL MEDICINE

## 2018-03-06 PROCEDURE — 84550 ASSAY OF BLOOD/URIC ACID: CPT | Performed by: INTERNAL MEDICINE

## 2018-03-06 RX ORDER — ALLOPURINOL 100 MG/1
100 TABLET ORAL DAILY
COMMUNITY
End: 2018-03-20 | Stop reason: ALTCHOICE

## 2018-03-06 NOTE — PROGRESS NOTES
HGB GREATER THAN 11.0, PROCRIT NOT INDICATED-PATIENT DISCHARGED HOME AMB AFTER APPOINTMENT COMPLETED.

## 2018-03-20 ENCOUNTER — HOSPITAL ENCOUNTER (OUTPATIENT)
Dept: INFUSION THERAPY | Facility: HOSPITAL | Age: 83
Discharge: HOME | End: 2018-03-20
Admitting: INTERNAL MEDICINE

## 2018-03-20 VITALS
DIASTOLIC BLOOD PRESSURE: 53 MMHG | HEART RATE: 86 BPM | OXYGEN SATURATION: 96 % | TEMPERATURE: 94.4 F | SYSTOLIC BLOOD PRESSURE: 133 MMHG | RESPIRATION RATE: 16 BRPM

## 2018-03-20 DIAGNOSIS — D63.1 ANEMIA OF CHRONIC RENAL FAILURE, STAGE 4 (SEVERE) (HCC): ICD-10-CM

## 2018-03-20 DIAGNOSIS — N18.4 ANEMIA OF CHRONIC RENAL FAILURE, STAGE 4 (SEVERE) (HCC): ICD-10-CM

## 2018-03-20 LAB
HCT VFR BLD AUTO: 35 % (ref 40.4–52.2)
HGB BLD-MCNC: 11.3 G/DL (ref 13.7–17.6)

## 2018-03-20 PROCEDURE — G0463 HOSPITAL OUTPT CLINIC VISIT: HCPCS

## 2018-03-20 PROCEDURE — 85018 HEMOGLOBIN: CPT | Performed by: INTERNAL MEDICINE

## 2018-03-20 PROCEDURE — 85014 HEMATOCRIT: CPT | Performed by: INTERNAL MEDICINE

## 2018-03-20 PROCEDURE — 36415 COLL VENOUS BLD VENIPUNCTURE: CPT

## 2018-03-20 RX ORDER — FEBUXOSTAT 40 MG/1
40 TABLET, FILM COATED ORAL EVERY OTHER DAY
COMMUNITY
End: 2018-05-17 | Stop reason: SINTOL

## 2018-03-20 NOTE — PROGRESS NOTES
HGB GREATER THAN 11.0, PROCRIT NOT INDICATED. DISCHARGED HOME AMB AFTER RESULTS DISCUSSED WITH PATIENT.

## 2018-03-23 ENCOUNTER — CLINICAL SUPPORT NO REQUIREMENTS (OUTPATIENT)
Dept: CARDIOLOGY | Facility: CLINIC | Age: 83
End: 2018-03-23

## 2018-03-23 ENCOUNTER — OFFICE VISIT (OUTPATIENT)
Dept: CARDIOLOGY | Facility: CLINIC | Age: 83
End: 2018-03-23

## 2018-03-23 VITALS
HEIGHT: 72 IN | SYSTOLIC BLOOD PRESSURE: 128 MMHG | BODY MASS INDEX: 24.52 KG/M2 | DIASTOLIC BLOOD PRESSURE: 62 MMHG | WEIGHT: 181 LBS | HEART RATE: 75 BPM

## 2018-03-23 DIAGNOSIS — I34.0 NON-RHEUMATIC MITRAL REGURGITATION: ICD-10-CM

## 2018-03-23 DIAGNOSIS — I48.20 CHRONIC ATRIAL FIBRILLATION (HCC): Primary | ICD-10-CM

## 2018-03-23 DIAGNOSIS — Z95.0 CARDIAC RESYNCHRONIZATION THERAPY PACEMAKER (CRT-P) IN PLACE: ICD-10-CM

## 2018-03-23 DIAGNOSIS — I50.22 CHRONIC SYSTOLIC CONGESTIVE HEART FAILURE (HCC): Primary | ICD-10-CM

## 2018-03-23 DIAGNOSIS — I42.8 NON-ISCHEMIC CARDIOMYOPATHY (HCC): ICD-10-CM

## 2018-03-23 DIAGNOSIS — Z79.899 ON AMIODARONE THERAPY: ICD-10-CM

## 2018-03-23 PROCEDURE — 99213 OFFICE O/P EST LOW 20 MIN: CPT | Performed by: INTERNAL MEDICINE

## 2018-03-23 PROCEDURE — 93000 ELECTROCARDIOGRAM COMPLETE: CPT | Performed by: INTERNAL MEDICINE

## 2018-03-23 PROCEDURE — 93280 PM DEVICE PROGR EVAL DUAL: CPT | Performed by: INTERNAL MEDICINE

## 2018-03-23 NOTE — PROGRESS NOTES
Subjective:     Encounter Date:03/23/2018      Patient ID: Zeb Obando Jr. is a 89 y.o. male.    Chief Complaint:  History of Present Illness    This is a 89-year-old male with a history of prior nonischemic cardiomyopathy, nonobstructive coronary artery disease, chronic atrial fibrillation, history of ventricular tachycardia arrest, biventricular pacemaker, hypothyroidism, who presents for follow-up.     The patient was previously followed by Dr. Garcia and Dr. Whitney with [facility].  His prior cardiac history includes placement of a permanent pacemaker in 1981 for complete heart block.  In 2006 he presented with a syncopal episode that was associated with ventricular tachycardia/ventricular fibrillation episode that was detected on this pacemaker.  He was found to have an ejection fraction of around 10-15% around that time.  A cardiac catheterization showed only mild coronary artery disease.  At that point they abandoned his right-sided pacemaker in place a biventricular AICD on his left side.  Following his BiVICD placement a repeat echocardiogram was performed in 4/2014 that showed improvement of his ejection fraction to 45-50%.  In 10/2015 his device was at [facility] and after conversation with his family patient decided he no longer wanted the AICD and had it replaced with a biventricular pacemaker.   He has a history of chronic atrial fibrillation for which she has not been on anticoagulation because of overall frailty and recurrent falls.     When I began following the patient 3/2016 I repeated echocardiogram that was performed in 6/2016.  This showed normal left ventricular systolic function and wall motion with an ejection fraction of 60%, normal diastolic function, mitral valve prolapse of the anterior leaflet with severe mitral regurgitation, mildly elevated right ventricular pressures.     Today he presents for routine follow-up.  Since I saw him last he reports that he's been  diagnosed with chronic kidney disease stage IV.  He's been followed closely by Dr. Sherman for this.  He had his device check today that shows that it is functioning normally biventricular pacing at 96%.  He's had no recent events.  Denies any chest pain, palpitations, PND or orthopnea, near-syncope or syncope, or lower extremity edema.  He has some shortness of breath that he relates to congestion for a recent cold.  He reports he's also been struggling with gout and trying different medications to control this.     Review of Systems   Constitution: Positive for malaise/fatigue. Negative for weakness.   HENT: Positive for congestion. Negative for hearing loss, hoarse voice, nosebleeds and sore throat.    Eyes: Negative for pain.   Cardiovascular: Negative for chest pain, claudication, cyanosis, dyspnea on exertion, irregular heartbeat, leg swelling, near-syncope, orthopnea, palpitations, paroxysmal nocturnal dyspnea and syncope.   Respiratory: Negative for shortness of breath and snoring.    Endocrine: Negative for cold intolerance, heat intolerance, polydipsia, polyphagia and polyuria.   Skin: Negative for itching and rash.   Musculoskeletal: Negative for arthritis, falls, joint pain, joint swelling, muscle cramps, muscle weakness and myalgias.   Gastrointestinal: Negative for constipation, diarrhea, dysphagia, heartburn, hematemesis, hematochezia, melena, nausea and vomiting.   Genitourinary: Negative for frequency, hematuria and hesitancy.   Neurological: Negative for excessive daytime sleepiness, dizziness, headaches, light-headedness and numbness.   Psychiatric/Behavioral: Negative for depression. The patient is not nervous/anxious.           Current Outpatient Prescriptions:   •  Acetaminophen (TYLENOL PO), Take 325 mg by mouth As Needed., Disp: , Rfl:   •  amiodarone (PACERONE) 200 MG tablet, Take 100 mg by mouth Every Night., Disp: , Rfl:   •  cetirizine (ZyrTEC) 10 MG tablet, Take 10 mg by mouth Daily. PRN,  Disp: , Rfl:   •  cholecalciferol (VITAMIN D3) 1000 units tablet, Take 1,000 Units by mouth Daily., Disp: , Rfl:   •  Cyanocobalamin (VITAMIN B 12 PO), Take 1 tablet by mouth Daily., Disp: , Rfl:   •  febuxostat (ULORIC) 40 MG tablet, Take 40 mg by mouth Every Other Day., Disp: , Rfl:   •  ferrous sulfate 325 (65 FE) MG tablet, Take 325 mg by mouth Daily With Breakfast., Disp: , Rfl:   •  finasteride (PROSCAR) 5 MG tablet, Take 5 mg by mouth Daily., Disp: , Rfl:   •  furosemide (LASIX) 20 MG tablet, Take 20 mg by mouth Daily., Disp: , Rfl:   •  HYDROcodone-acetaminophen (NORCO) 7.5-325 MG per tablet, Take 1 tablet by mouth Every 4 (Four) Hours As Needed for Moderate Pain ., Disp: 20 tablet, Rfl: 0  •  levothyroxine (SYNTHROID, LEVOTHROID) 100 MCG tablet, Take 100 mcg by mouth Every Morning., Disp: , Rfl:   •  RaNITidine HCl (ZANTAC PO), Take 1 tablet by mouth As Needed., Disp: , Rfl:   •  triamcinolone (KENALOG) 0.1 % lotion, Apply 1 application topically As Needed., Disp: , Rfl:     Past Medical History:   Diagnosis Date   • Abnormal ECG 1979   • AICD (automatic cardioverter/defibrillator) present    • Anemia of chronic renal failure 12/6/2017   • Atrial fibrillation    • AV block    • Blockage of kidney vein    • BPH (benign prostatic hypertrophy)    • Cardiac arrest    • Cardiomyopathy    • CHF (congestive heart failure)    • Chronic kidney disease    • Clotting disorder    • Coronary artery disease    • Disease of thyroid gland    • Diverticulitis of colon    • Diverticulosis    • Edema    • Gallbladder disease    • GI (gastrointestinal bleed)     while on coumadin   • Gout     RIGHT GREAT TOE   • Health care maintenance    • Hearing loss of both ears    • Heart disease    • Heart failure    • History of benign prostatic hypertrophy    • History of cataract    • History of heart failure    • History of hypertension    • History of prostatitis    • Hoarseness    • Hypertension    • Inguinal hernia    • Irregular  "heart rate    • Itchy skin    • Lupus    • Muscle weakness    • Prostatitis    • SLE (systemic lupus erythematosus) 1980   • Sleep apnea    • Systemic lupus erythematosus    • Thyroid disease    • VT (ventricular tachycardia)    • Wears glasses    • Weight loss      Past Surgical History:   Procedure Laterality Date   • CARDIAC PACEMAKER PLACEMENT  10/30/2015    [facility] [employer] [facility] PACEMAKER-DR. REINA KATZ-LEFT CHEST   • CATARACT EXTRACTION     • CHOLECYSTECTOMY  1995   • CORONARY ANGIOPLASTY     • CYSTOSCOPY TRANSURETHRAL RESECTION OF PROSTATE      X 2   • INGUINAL HERNIA REPAIR Right 10/10/2016    Procedure: LAPAROSCOPIC RIGHT INGUINAL HERNIA  REPAIR WITH MESH;  Surgeon: Saad Cardenas MD;  Location: [facility] OR;  Service:    • NOSE SURGERY     • PACEMAKER IMPLANTATION      RIGHT CHEST-NONFUNCTIONING     Family History   Problem Relation Age of Onset   • Heart disease Mother      Cancer & Heart   • Cancer Mother      vaginal   • Heart disease Brother      pacemaker   • Lung cancer Brother    • Lung cancer Father    • Alcohol abuse Father    • Heart disease Brother      Cancer     Social History   Substance Use Topics   • Smoking status: Former Smoker     Packs/day: 0.25     Years: 0.50     Start date: 9/1/1953     Quit date: 1953   • Smokeless tobacco: Never Used   • Alcohol use Yes      Comment: Beer with boiled seafood; wine S TOASTS           ECG 12 Lead  Date/Time: 3/23/2018 11:05 AM  Performed by: MIRLANDE ANDERSON  Authorized by: MIRLANDE ANDERSON   Comparison: compared with previous ECG   Similar to previous ECG  Comments: Biventricular paced rhythm               Objective:         Visit Vitals  /62 (BP Location: Left arm, Patient Position: Sitting)   Pulse 75   Ht 182.9 cm (72\")   Wt 82.1 kg (181 lb)   BMI 24.55 kg/m²          Physical Exam   Constitutional: He is oriented to person, place, and time. He appears well-developed and well-nourished.   HENT:   Head: Normocephalic and " atraumatic.   Eyes: Conjunctivae, EOM and lids are normal. Pupils are equal, round, and reactive to light.   Neck: Normal range of motion and full passive range of motion without pain. Neck supple. No JVD present. Carotid bruit is not present.   Cardiovascular: Normal rate, regular rhythm, S1 normal and S2 normal.  Exam reveals no gallop.    No murmur heard.  Pulses:       Radial pulses are 2+ on the right side, and 2+ on the left side.   No bilateral lower extremity edema   Pulmonary/Chest: Effort normal and breath sounds normal.   Abdominal: Soft. Normal appearance.   Lymphadenopathy:     He has no cervical adenopathy.   Neurological: He is alert and oriented to person, place, and time.   Skin: Skin is warm, dry and intact.   Psychiatric: He has a normal mood and affect.       Lab Review:       Assessment:          Diagnosis Plan   1. Chronic atrial fibrillation     2. History of non-ischemic cardiomyopathy     3. Cardiac resynchronization therapy pacemaker (CRT-P) in place     4. Non-rheumatic mitral regurgitation            Plan:       1.  Chronic atrial fibrillation.  He is currently ventricular paced today.  He cannot tell by his EKG whether he is in sinus rhythm are not today.  He is on chronic amiodarone therapy.  No anticoagulation because of a history of falls and overall frailty.  2.  History of nonischemic cardiomyopathy.  This was resolved on his most recent echocardiogram with an EF of 60%.  3.  Status post biventricular pacemaker.  Routine device check today shows that this is functioning normally.  4.  Mitral regurgitation.  This appeared severe on his most recent echocardiogram.  He appears clinically euvolemic on low-dose of furosemide.  We'll defer management of his diuretics to nephrology due to his history of chronic kidney disease.  5.  Chronic kidney disease stage IV.  Followed by nephrology.  6.  Chronic amiodarone IV.  We'll try to get recent labs from Dr. Olivares's office to ensure that he's  had recent TSH and liver panel performed.    Will plan on seeing the patient back again in 6 months.    Atrial Fibrillation and Atrial Flutter  Assessment  • The patient has permanent atrial fibrillation  • This is non-valvular in etiology  • The patient's CHADS2-VASc score is 3  • A [id number] score of 2 or more is considered a high risk for a thromboembolic event    Plan  • Continue in atrial fibrillation with rate control  • Continue amiodarone for rhythm control

## 2018-04-03 ENCOUNTER — HOSPITAL ENCOUNTER (OUTPATIENT)
Dept: INFUSION THERAPY | Facility: HOSPITAL | Age: 83
Discharge: HOME | End: 2018-04-03
Admitting: PSYCHIATRY & NEUROLOGY

## 2018-04-03 VITALS
TEMPERATURE: 97.2 F | DIASTOLIC BLOOD PRESSURE: 57 MMHG | RESPIRATION RATE: 16 BRPM | SYSTOLIC BLOOD PRESSURE: 114 MMHG | HEART RATE: 74 BPM | OXYGEN SATURATION: 98 %

## 2018-04-03 DIAGNOSIS — N18.4 ANEMIA OF CHRONIC RENAL FAILURE, STAGE 4 (SEVERE) (HCC): ICD-10-CM

## 2018-04-03 DIAGNOSIS — D63.1 ANEMIA OF CHRONIC RENAL FAILURE, STAGE 4 (SEVERE) (HCC): ICD-10-CM

## 2018-04-03 LAB
HCT VFR BLD AUTO: 33.4 % (ref 40.4–52.2)
HGB BLD-MCNC: 10.8 G/DL (ref 13.7–17.6)

## 2018-04-03 PROCEDURE — 85014 HEMATOCRIT: CPT | Performed by: INTERNAL MEDICINE

## 2018-04-03 PROCEDURE — 85018 HEMOGLOBIN: CPT | Performed by: INTERNAL MEDICINE

## 2018-04-03 PROCEDURE — 36415 COLL VENOUS BLD VENIPUNCTURE: CPT

## 2018-04-03 PROCEDURE — 96372 THER/PROPH/DIAG INJ SC/IM: CPT

## 2018-04-03 PROCEDURE — 63510000001 EPOETIN ALFA PER 1000 UNITS: Performed by: INTERNAL MEDICINE

## 2018-04-03 RX ADMIN — ERYTHROPOIETIN 20000 UNITS: 20000 INJECTION, SOLUTION INTRAVENOUS; SUBCUTANEOUS at 14:47

## 2018-04-17 ENCOUNTER — HOSPITAL ENCOUNTER (OUTPATIENT)
Dept: INFUSION THERAPY | Facility: HOSPITAL | Age: 83
Discharge: HOME | End: 2018-04-17
Admitting: PSYCHIATRY & NEUROLOGY

## 2018-04-17 VITALS
SYSTOLIC BLOOD PRESSURE: 118 MMHG | OXYGEN SATURATION: 96 % | DIASTOLIC BLOOD PRESSURE: 69 MMHG | HEART RATE: 80 BPM | TEMPERATURE: 95 F | RESPIRATION RATE: 16 BRPM

## 2018-04-17 DIAGNOSIS — D63.1 ANEMIA OF CHRONIC RENAL FAILURE, STAGE 4 (SEVERE) (HCC): ICD-10-CM

## 2018-04-17 DIAGNOSIS — N18.4 ANEMIA OF CHRONIC RENAL FAILURE, STAGE 4 (SEVERE) (HCC): ICD-10-CM

## 2018-04-17 LAB
HCT VFR BLD AUTO: 37.3 % (ref 40.4–52.2)
HGB BLD-MCNC: 11.9 G/DL (ref 13.7–17.6)

## 2018-04-17 PROCEDURE — 36415 COLL VENOUS BLD VENIPUNCTURE: CPT

## 2018-04-17 PROCEDURE — 85014 HEMATOCRIT: CPT | Performed by: INTERNAL MEDICINE

## 2018-04-17 PROCEDURE — 85018 HEMOGLOBIN: CPT | Performed by: INTERNAL MEDICINE

## 2018-04-17 PROCEDURE — G0463 HOSPITAL OUTPT CLINIC VISIT: HCPCS

## 2018-04-17 NOTE — PROGRESS NOTES
Procrit not indicated per lab results & MD order.  Pt DC per ambulation @ 1430.    Dr. Sherman's office contacted as patient said he was going to be changed to monthly at his last appointment.  New order faxed over after patient left.  Pt contacted @ home with new appointment scheduled for 1 month out.

## 2018-05-17 ENCOUNTER — HOSPITAL ENCOUNTER (OUTPATIENT)
Dept: INFUSION THERAPY | Facility: HOSPITAL | Age: 83
Discharge: HOME | End: 2018-05-17
Admitting: PSYCHIATRY & NEUROLOGY

## 2018-05-17 VITALS
DIASTOLIC BLOOD PRESSURE: 63 MMHG | SYSTOLIC BLOOD PRESSURE: 131 MMHG | WEIGHT: 189 LBS | HEIGHT: 72 IN | TEMPERATURE: 97 F | HEART RATE: 78 BPM | RESPIRATION RATE: 20 BRPM | OXYGEN SATURATION: 100 % | BODY MASS INDEX: 25.6 KG/M2

## 2018-05-17 DIAGNOSIS — N18.4 ANEMIA OF CHRONIC RENAL FAILURE, STAGE 4 (SEVERE) (HCC): ICD-10-CM

## 2018-05-17 DIAGNOSIS — D63.1 ANEMIA OF CHRONIC RENAL FAILURE, STAGE 4 (SEVERE) (HCC): ICD-10-CM

## 2018-05-17 LAB
HCT VFR BLD AUTO: 34.7 % (ref 40.4–52.2)
HGB BLD-MCNC: 10.9 G/DL (ref 13.7–17.6)

## 2018-05-17 PROCEDURE — 85018 HEMOGLOBIN: CPT | Performed by: INTERNAL MEDICINE

## 2018-05-17 PROCEDURE — 36415 COLL VENOUS BLD VENIPUNCTURE: CPT

## 2018-05-17 PROCEDURE — 63510000001 EPOETIN ALFA PER 1000 UNITS: Performed by: INTERNAL MEDICINE

## 2018-05-17 PROCEDURE — 96372 THER/PROPH/DIAG INJ SC/IM: CPT

## 2018-05-17 PROCEDURE — 85014 HEMATOCRIT: CPT | Performed by: INTERNAL MEDICINE

## 2018-05-17 RX ADMIN — ERYTHROPOIETIN 20000 UNITS: 20000 INJECTION, SOLUTION INTRAVENOUS; SUBCUTANEOUS at 14:41

## 2018-06-18 ENCOUNTER — HOSPITAL ENCOUNTER (OUTPATIENT)
Dept: INFUSION THERAPY | Facility: HOSPITAL | Age: 83
Discharge: HOME | End: 2018-06-18
Admitting: INTERNAL MEDICINE

## 2018-06-18 VITALS
TEMPERATURE: 97.6 F | DIASTOLIC BLOOD PRESSURE: 63 MMHG | RESPIRATION RATE: 20 BRPM | HEART RATE: 77 BPM | SYSTOLIC BLOOD PRESSURE: 116 MMHG | OXYGEN SATURATION: 97 %

## 2018-06-18 DIAGNOSIS — N18.4 ANEMIA OF CHRONIC RENAL FAILURE, STAGE 4 (SEVERE) (HCC): ICD-10-CM

## 2018-06-18 DIAGNOSIS — D63.1 ANEMIA OF CHRONIC RENAL FAILURE, STAGE 4 (SEVERE) (HCC): ICD-10-CM

## 2018-06-18 LAB
25(OH)D3 SERPL-MCNC: 52.9 NG/ML (ref 30–100)
ALBUMIN SERPL-MCNC: 3.9 G/DL (ref 3.5–5.2)
ALBUMIN/GLOB SERPL: 1.1 G/DL
ALP SERPL-CCNC: 74 U/L (ref 39–117)
ALT SERPL W P-5'-P-CCNC: 12 U/L (ref 1–41)
ANION GAP SERPL CALCULATED.3IONS-SCNC: 12.3 MMOL/L
AST SERPL-CCNC: 22 U/L (ref 1–40)
BILIRUB SERPL-MCNC: 0.6 MG/DL (ref 0.1–1.2)
BUN BLD-MCNC: 34 MG/DL (ref 8–23)
BUN/CREAT SERPL: 12 (ref 7–25)
CALCIUM SPEC-SCNC: 8.9 MG/DL (ref 8.6–10.5)
CALCIUM SPEC-SCNC: 9.1 MG/DL (ref 8.6–10.5)
CHLORIDE SERPL-SCNC: 103 MMOL/L (ref 98–107)
CO2 SERPL-SCNC: 26.7 MMOL/L (ref 22–29)
CREAT BLD-MCNC: 2.84 MG/DL (ref 0.76–1.27)
DEPRECATED RDW RBC AUTO: 45.6 FL (ref 37–54)
ERYTHROCYTE [DISTWIDTH] IN BLOOD BY AUTOMATED COUNT: 13.3 % (ref 11.5–14.5)
FERRITIN SERPL-MCNC: 171.2 NG/ML (ref 30–400)
FOLATE SERPL-MCNC: >20 NG/ML (ref 4.78–24.2)
GFR SERPL CREATININE-BSD FRML MDRD: 21 ML/MIN/1.73
GLOBULIN UR ELPH-MCNC: 3.4 GM/DL
GLUCOSE BLD-MCNC: 93 MG/DL (ref 65–99)
HCT VFR BLD AUTO: 35.9 % (ref 40.4–52.2)
HGB BLD-MCNC: 11.7 G/DL (ref 13.7–17.6)
IRON 24H UR-MRATE: 76 MCG/DL (ref 59–158)
IRON SATN MFR SERPL: 24 % (ref 20–50)
MCH RBC QN AUTO: 30.6 PG (ref 27–32.7)
MCHC RBC AUTO-ENTMCNC: 32.6 G/DL (ref 32.6–36.4)
MCV RBC AUTO: 94 FL (ref 79.8–96.2)
PHOSPHATE SERPL-MCNC: 4 MG/DL (ref 2.5–4.5)
PLATELET # BLD AUTO: 138 10*3/MM3 (ref 140–500)
PMV BLD AUTO: 9.5 FL (ref 6–12)
POTASSIUM BLD-SCNC: 4.1 MMOL/L (ref 3.5–5.2)
PROT SERPL-MCNC: 7.3 G/DL (ref 6–8.5)
PTH-INTACT SERPL-MCNC: 80.8 PG/ML (ref 15–65)
RBC # BLD AUTO: 3.82 10*6/MM3 (ref 4.6–6)
SODIUM BLD-SCNC: 142 MMOL/L (ref 136–145)
TIBC SERPL-MCNC: 313 MCG/DL
TRANSFERRIN SERPL-MCNC: 210 MG/DL (ref 200–360)
URATE SERPL-MCNC: 8.5 MG/DL (ref 3.4–7)
VIT B12 BLD-MCNC: 1000 PG/ML (ref 211–946)
WBC NRBC COR # BLD: 3.15 10*3/MM3 (ref 4.5–10.7)

## 2018-06-18 PROCEDURE — 85027 COMPLETE CBC AUTOMATED: CPT | Performed by: INTERNAL MEDICINE

## 2018-06-18 PROCEDURE — G0463 HOSPITAL OUTPT CLINIC VISIT: HCPCS

## 2018-06-18 PROCEDURE — 82746 ASSAY OF FOLIC ACID SERUM: CPT | Performed by: INTERNAL MEDICINE

## 2018-06-18 PROCEDURE — 36415 COLL VENOUS BLD VENIPUNCTURE: CPT

## 2018-06-18 PROCEDURE — 82306 VITAMIN D 25 HYDROXY: CPT | Performed by: INTERNAL MEDICINE

## 2018-06-18 PROCEDURE — 84550 ASSAY OF BLOOD/URIC ACID: CPT | Performed by: INTERNAL MEDICINE

## 2018-06-18 PROCEDURE — 83540 ASSAY OF IRON: CPT | Performed by: INTERNAL MEDICINE

## 2018-06-18 PROCEDURE — 82607 VITAMIN B-12: CPT | Performed by: INTERNAL MEDICINE

## 2018-06-18 PROCEDURE — 84466 ASSAY OF TRANSFERRIN: CPT | Performed by: INTERNAL MEDICINE

## 2018-06-18 PROCEDURE — 84100 ASSAY OF PHOSPHORUS: CPT | Performed by: INTERNAL MEDICINE

## 2018-06-18 PROCEDURE — 80053 COMPREHEN METABOLIC PANEL: CPT | Performed by: INTERNAL MEDICINE

## 2018-06-18 PROCEDURE — 82728 ASSAY OF FERRITIN: CPT | Performed by: INTERNAL MEDICINE

## 2018-06-18 PROCEDURE — 83970 ASSAY OF PARATHORMONE: CPT | Performed by: INTERNAL MEDICINE

## 2018-06-25 ENCOUNTER — CLINICAL SUPPORT NO REQUIREMENTS (OUTPATIENT)
Dept: CARDIOLOGY | Facility: CLINIC | Age: 83
End: 2018-06-25

## 2018-06-25 DIAGNOSIS — I50.22 CHRONIC SYSTOLIC CONGESTIVE HEART FAILURE (HCC): Primary | ICD-10-CM

## 2018-06-25 PROCEDURE — 93297 REM INTERROG DEV EVAL ICPMS: CPT | Performed by: INTERNAL MEDICINE

## 2018-06-25 PROCEDURE — 93294 REM INTERROG EVL PM/LDLS PM: CPT | Performed by: INTERNAL MEDICINE

## 2018-06-25 PROCEDURE — 93296 REM INTERROG EVL PM/IDS: CPT | Performed by: INTERNAL MEDICINE

## 2018-07-19 ENCOUNTER — HOSPITAL ENCOUNTER (OUTPATIENT)
Dept: INFUSION THERAPY | Facility: HOSPITAL | Age: 83
Discharge: HOME | End: 2018-07-19

## 2018-08-20 ENCOUNTER — HOSPITAL ENCOUNTER (OUTPATIENT)
Dept: INFUSION THERAPY | Facility: HOSPITAL | Age: 83
Discharge: HOME | End: 2018-08-20
Admitting: INTERNAL MEDICINE

## 2018-08-20 VITALS
TEMPERATURE: 98.3 F | DIASTOLIC BLOOD PRESSURE: 55 MMHG | HEART RATE: 73 BPM | OXYGEN SATURATION: 97 % | SYSTOLIC BLOOD PRESSURE: 117 MMHG | RESPIRATION RATE: 16 BRPM

## 2018-08-20 DIAGNOSIS — N18.4 ANEMIA IN STAGE 4 CHRONIC KIDNEY DISEASE (HCC): Primary | ICD-10-CM

## 2018-08-20 DIAGNOSIS — N18.4 ANEMIA OF CHRONIC RENAL FAILURE, STAGE 4 (SEVERE) (HCC): ICD-10-CM

## 2018-08-20 DIAGNOSIS — D63.1 ANEMIA OF CHRONIC RENAL FAILURE, STAGE 4 (SEVERE) (HCC): ICD-10-CM

## 2018-08-20 DIAGNOSIS — D63.1 ANEMIA IN STAGE 4 CHRONIC KIDNEY DISEASE (HCC): Primary | ICD-10-CM

## 2018-08-20 LAB
HCT VFR BLD AUTO: 34.9 % (ref 40.4–52.2)
HGB BLD-MCNC: 11.4 G/DL (ref 13.7–17.6)

## 2018-08-20 PROCEDURE — 36415 COLL VENOUS BLD VENIPUNCTURE: CPT

## 2018-08-20 PROCEDURE — 85018 HEMOGLOBIN: CPT | Performed by: INTERNAL MEDICINE

## 2018-08-20 PROCEDURE — 85014 HEMATOCRIT: CPT | Performed by: INTERNAL MEDICINE

## 2018-08-20 PROCEDURE — G0463 HOSPITAL OUTPT CLINIC VISIT: HCPCS

## 2018-09-17 ENCOUNTER — HOSPITAL ENCOUNTER (OUTPATIENT)
Dept: INFUSION THERAPY | Facility: HOSPITAL | Age: 83
Discharge: HOME | End: 2018-09-17
Admitting: INTERNAL MEDICINE

## 2018-09-17 VITALS
HEART RATE: 83 BPM | RESPIRATION RATE: 20 BRPM | TEMPERATURE: 97.3 F | SYSTOLIC BLOOD PRESSURE: 125 MMHG | OXYGEN SATURATION: 98 % | DIASTOLIC BLOOD PRESSURE: 65 MMHG

## 2018-09-17 DIAGNOSIS — N18.4 ANEMIA OF CHRONIC RENAL FAILURE, STAGE 4 (SEVERE) (HCC): ICD-10-CM

## 2018-09-17 DIAGNOSIS — D63.1 ANEMIA OF CHRONIC RENAL FAILURE, STAGE 4 (SEVERE) (HCC): ICD-10-CM

## 2018-09-17 LAB
25(OH)D3 SERPL-MCNC: 51.1 NG/ML (ref 30–100)
ALBUMIN SERPL-MCNC: 3.8 G/DL (ref 3.5–5.2)
ALBUMIN/GLOB SERPL: 1.1 G/DL
ALP SERPL-CCNC: 83 U/L (ref 39–117)
ALT SERPL W P-5'-P-CCNC: 13 U/L (ref 1–41)
ANION GAP SERPL CALCULATED.3IONS-SCNC: 13.2 MMOL/L
AST SERPL-CCNC: 17 U/L (ref 1–40)
BILIRUB SERPL-MCNC: 0.6 MG/DL (ref 0.1–1.2)
BUN BLD-MCNC: 34 MG/DL (ref 8–23)
BUN/CREAT SERPL: 13.7 (ref 7–25)
CALCIUM SPEC-SCNC: 8.6 MG/DL (ref 8.2–9.6)
CALCIUM SPEC-SCNC: 8.7 MG/DL (ref 8.2–9.6)
CHLORIDE SERPL-SCNC: 102 MMOL/L (ref 98–107)
CO2 SERPL-SCNC: 25.8 MMOL/L (ref 22–29)
CREAT BLD-MCNC: 2.49 MG/DL (ref 0.76–1.27)
DEPRECATED RDW RBC AUTO: 47.4 FL (ref 37–54)
ERYTHROCYTE [DISTWIDTH] IN BLOOD BY AUTOMATED COUNT: 14 % (ref 11.5–14.5)
FERRITIN SERPL-MCNC: 184.5 NG/ML (ref 30–400)
FOLATE SERPL-MCNC: >20 NG/ML (ref 4.78–24.2)
GFR SERPL CREATININE-BSD FRML MDRD: 24 ML/MIN/1.73
GLOBULIN UR ELPH-MCNC: 3.4 GM/DL
GLUCOSE BLD-MCNC: 90 MG/DL (ref 65–99)
HCT VFR BLD AUTO: 35 % (ref 40.4–52.2)
HGB BLD-MCNC: 11.4 G/DL (ref 13.7–17.6)
IRON 24H UR-MRATE: 72 MCG/DL (ref 59–158)
IRON SATN MFR SERPL: 23 % (ref 20–50)
MCH RBC QN AUTO: 30.2 PG (ref 27–32.7)
MCHC RBC AUTO-ENTMCNC: 32.6 G/DL (ref 32.6–36.4)
MCV RBC AUTO: 92.8 FL (ref 79.8–96.2)
PHOSPHATE SERPL-MCNC: 4.3 MG/DL (ref 2.5–4.5)
PLATELET # BLD AUTO: 159 10*3/MM3 (ref 140–500)
PMV BLD AUTO: 9.1 FL (ref 6–12)
POTASSIUM BLD-SCNC: 4 MMOL/L (ref 3.5–5.2)
PROT SERPL-MCNC: 7.2 G/DL (ref 6–8.5)
PTH-INTACT SERPL-MCNC: 124.2 PG/ML (ref 15–65)
RBC # BLD AUTO: 3.77 10*6/MM3 (ref 4.6–6)
SODIUM BLD-SCNC: 141 MMOL/L (ref 136–145)
TIBC SERPL-MCNC: 316 MCG/DL (ref 298–536)
TRANSFERRIN SERPL-MCNC: 212 MG/DL (ref 200–360)
URATE SERPL-MCNC: 8.4 MG/DL (ref 3.4–7)
VIT B12 BLD-MCNC: 1075 PG/ML (ref 211–946)
WBC NRBC COR # BLD: 4.11 10*3/MM3 (ref 4.5–10.7)

## 2018-09-17 PROCEDURE — G0463 HOSPITAL OUTPT CLINIC VISIT: HCPCS

## 2018-09-17 PROCEDURE — 36415 COLL VENOUS BLD VENIPUNCTURE: CPT

## 2018-09-17 PROCEDURE — 84100 ASSAY OF PHOSPHORUS: CPT | Performed by: INTERNAL MEDICINE

## 2018-09-17 PROCEDURE — 84550 ASSAY OF BLOOD/URIC ACID: CPT | Performed by: INTERNAL MEDICINE

## 2018-09-17 PROCEDURE — 84466 ASSAY OF TRANSFERRIN: CPT | Performed by: INTERNAL MEDICINE

## 2018-09-17 PROCEDURE — 83540 ASSAY OF IRON: CPT | Performed by: INTERNAL MEDICINE

## 2018-09-17 PROCEDURE — 85027 COMPLETE CBC AUTOMATED: CPT | Performed by: INTERNAL MEDICINE

## 2018-09-17 PROCEDURE — 82306 VITAMIN D 25 HYDROXY: CPT | Performed by: INTERNAL MEDICINE

## 2018-09-17 PROCEDURE — 82746 ASSAY OF FOLIC ACID SERUM: CPT | Performed by: INTERNAL MEDICINE

## 2018-09-17 PROCEDURE — 82728 ASSAY OF FERRITIN: CPT | Performed by: INTERNAL MEDICINE

## 2018-09-17 PROCEDURE — 80053 COMPREHEN METABOLIC PANEL: CPT | Performed by: INTERNAL MEDICINE

## 2018-09-17 PROCEDURE — 82607 VITAMIN B-12: CPT | Performed by: INTERNAL MEDICINE

## 2018-09-17 PROCEDURE — 83970 ASSAY OF PARATHORMONE: CPT | Performed by: INTERNAL MEDICINE

## 2018-09-25 ENCOUNTER — OFFICE VISIT (OUTPATIENT)
Dept: CARDIOLOGY | Facility: CLINIC | Age: 83
End: 2018-09-25

## 2018-09-25 ENCOUNTER — CLINICAL SUPPORT NO REQUIREMENTS (OUTPATIENT)
Dept: CARDIOLOGY | Facility: CLINIC | Age: 83
End: 2018-09-25

## 2018-09-25 VITALS
SYSTOLIC BLOOD PRESSURE: 122 MMHG | HEIGHT: 72 IN | DIASTOLIC BLOOD PRESSURE: 64 MMHG | WEIGHT: 192 LBS | BODY MASS INDEX: 26.01 KG/M2 | HEART RATE: 71 BPM

## 2018-09-25 DIAGNOSIS — Z95.0 CARDIAC RESYNCHRONIZATION THERAPY PACEMAKER (CRT-P) IN PLACE: ICD-10-CM

## 2018-09-25 DIAGNOSIS — I42.8 NON-ISCHEMIC CARDIOMYOPATHY (HCC): ICD-10-CM

## 2018-09-25 DIAGNOSIS — Z79.899 ON AMIODARONE THERAPY: ICD-10-CM

## 2018-09-25 DIAGNOSIS — I34.0 NON-RHEUMATIC MITRAL REGURGITATION: ICD-10-CM

## 2018-09-25 DIAGNOSIS — I48.20 CHRONIC ATRIAL FIBRILLATION (HCC): Primary | ICD-10-CM

## 2018-09-25 DIAGNOSIS — I42.8 NON-ISCHEMIC CARDIOMYOPATHY (HCC): Primary | ICD-10-CM

## 2018-09-25 PROCEDURE — 93280 PM DEVICE PROGR EVAL DUAL: CPT | Performed by: INTERNAL MEDICINE

## 2018-09-25 PROCEDURE — 93290 INTERROG DEV EVAL ICPMS IP: CPT | Performed by: INTERNAL MEDICINE

## 2018-09-25 PROCEDURE — 93000 ELECTROCARDIOGRAM COMPLETE: CPT | Performed by: INTERNAL MEDICINE

## 2018-09-25 PROCEDURE — 99214 OFFICE O/P EST MOD 30 MIN: CPT | Performed by: INTERNAL MEDICINE

## 2018-09-25 NOTE — PROGRESS NOTES
Subjective:     Encounter Date:09/25/2018      Patient ID: Zeb Obando Jr. is a 89 y.o. male.    Chief Complaint:  History of Present Illness    This is a 89-year-old male with a history of resolved nonischemic cardiomyopathy, nonobstructive coronary artery disease, chronic atrial fibrillation, history of ventricular tachycardia arrest, biventricular pacemaker, hypothyroidism, who presents for follow-up.     The patient was previously followed by Dr. Garcia and Dr. Whitney with [facility].  His prior cardiac history includes placement of a permanent pacemaker in 1981 for complete heart block.  In 2006 he presented with a syncopal episode that was associated with ventricular tachycardia/ventricular fibrillation episode that was detected on this pacemaker.  He was found to have an ejection fraction of around 10-15% around that time.  A cardiac catheterization showed only mild coronary artery disease.  At that point they abandoned his right-sided pacemaker in place a biventricular AICD on his left side.  Following his BiVICD placement a repeat echocardiogram was performed in 4/2014 that showed improvement of his ejection fraction to 45-50%.  In 10/2015 his device was at [facility] and after conversation with his family patient decided he no longer wanted the AICD and had it replaced with a biventricular pacemaker.   He has a history of chronic atrial fibrillation for which she has not been on anticoagulation because of overall frailty and recurrent falls.     When I began following the patient 3/2016 I repeated echocardiogram that was performed in 6/2016.  This showed normal left ventricular systolic function and wall motion with an ejection fraction of 60%, normal diastolic function, mitral valve prolapse of the anterior leaflet with severe mitral regurgitation, mildly elevated right ventricular pressures.     In follow up he has developed chronic kidney disease stage IV which is closely followed by  Dr. Sherman.  He otherwise has been stable from a cardiac standpoint and no significant changes have been made to his management.      Today presents for routine follow-up.  Since I saw him last the patient had his 89th birthday.  He feels well from a cardiac standpoint.  Denies any chest pain, shortness of breath, palpitations, PND or orthopnea, near-syncope or syncope.  He has some ankle edema at times but this resolves if he takes an extra furosemide.  He some lightheadedness with position changes.  He had his device interrogated today in the office and shows normal function.  His thyroid function is followed closely by Dr. Olivares.  He had a recent LFTs done that were normal.  He has not had any recent chest x-rays or pulmonary function tests.  He reports that his most recent lab showed worsening in his renal function and he suspects that the topic of dialysis will be brought up.  The patient reports that he is not planning on pursuing dialysis.    Review of Systems   Constitution: Positive for malaise/fatigue. Negative for weakness.   HENT: Negative for hearing loss, hoarse voice, nosebleeds and sore throat.    Eyes: Negative for pain.   Cardiovascular: Positive for leg swelling. Negative for chest pain, claudication, cyanosis, dyspnea on exertion, irregular heartbeat, near-syncope, orthopnea, palpitations, paroxysmal nocturnal dyspnea and syncope.   Respiratory: Negative for shortness of breath and snoring.    Endocrine: Negative for cold intolerance, heat intolerance, polydipsia, polyphagia and polyuria.   Skin: Negative for itching and rash.   Musculoskeletal: Negative for arthritis, falls, joint pain, joint swelling, muscle cramps, muscle weakness and myalgias.   Gastrointestinal: Negative for constipation, diarrhea, dysphagia, heartburn, hematemesis, hematochezia, melena, nausea and vomiting.   Genitourinary: Negative for frequency, hematuria and hesitancy.   Neurological: Positive for light-headedness. Negative  for excessive daytime sleepiness, dizziness, headaches and numbness.   Psychiatric/Behavioral: Negative for depression. The patient is not nervous/anxious.           Current Outpatient Prescriptions:   •  Acetaminophen (TYLENOL PO), Take 325 mg by mouth As Needed., Disp: , Rfl:   •  amiodarone (PACERONE) 200 MG tablet, Take 100 mg by mouth Every Night., Disp: , Rfl:   •  cetirizine (ZyrTEC) 10 MG tablet, Take 10 mg by mouth Daily. PRN, Disp: , Rfl:   •  cholecalciferol (VITAMIN D3) 1000 units tablet, Take 1,000 Units by mouth Daily., Disp: , Rfl:   •  Cyanocobalamin (VITAMIN B 12 PO), Take 1 tablet by mouth Daily., Disp: , Rfl:   •  ferrous sulfate 325 (65 FE) MG tablet, Take 325 mg by mouth Daily With Breakfast., Disp: , Rfl:   •  finasteride (PROSCAR) 5 MG tablet, Take 5 mg by mouth Daily., Disp: , Rfl:   •  furosemide (LASIX) 20 MG tablet, Take 20 mg by mouth Daily., Disp: , Rfl:   •  HYDROcodone-acetaminophen (NORCO) 7.5-325 MG per tablet, Take 1 tablet by mouth Every 4 (Four) Hours As Needed for Moderate Pain ., Disp: 20 tablet, Rfl: 0  •  levothyroxine (SYNTHROID, LEVOTHROID) 100 MCG tablet, Take 100 mcg by mouth Every Morning., Disp: , Rfl:   •  triamcinolone (KENALOG) 0.1 % lotion, Apply 1 application topically As Needed., Disp: , Rfl:   •  RaNITidine HCl (ZANTAC PO), Take 1 tablet by mouth As Needed., Disp: , Rfl:     Past Medical History:   Diagnosis Date   • Abnormal ECG 1979   • AICD (automatic cardioverter/defibrillator) present    • Anemia of chronic renal failure 12/6/2017   • Atrial fibrillation (CMS/HCC)    • AV block    • Blockage of kidney vein (CMS/HCC)    • BPH (benign prostatic hypertrophy)    • Cardiac arrest (CMS/HCC)    • Cardiomyopathy (CMS/HCC)    • CHF (congestive heart failure) (CMS/HCC)    • Chronic kidney disease    • Clotting disorder (CMS/HCC)    • Coronary artery disease    • Disease of thyroid gland    • Diverticulitis of colon    • Diverticulosis    • Edema    • Gallbladder  disease    • GI (gastrointestinal bleed)     while on coumadin   • Gout     RIGHT GREAT TOE   • Health care maintenance    • Hearing loss of both ears    • Heart disease    • Heart failure (CMS/HCC)    • History of benign prostatic hypertrophy    • History of cataract    • History of heart failure    • History of hypertension    • History of prostatitis    • Hoarseness    • Hypertension    • Inguinal hernia    • Irregular heart rate    • Itchy skin    • Lupus    • Muscle weakness    • Prostatitis    • SLE (systemic lupus erythematosus) (CMS/HCC) 1980   • Sleep apnea    • Systemic lupus erythematosus (CMS/HCC)    • Thyroid disease    • VT (ventricular tachycardia) (CMS/HCC)    • Wears glasses    • Weight loss      Past Surgical History:   Procedure Laterality Date   • CARDIAC PACEMAKER PLACEMENT  10/30/2015    [facility] [employer] BIV[employer] PACEMAKER-DR. REINA KATZ-LEFT CHEST   • CATARACT EXTRACTION     • CHOLECYSTECTOMY  1995   • CORONARY ANGIOPLASTY     • CYSTOSCOPY TRANSURETHRAL RESECTION OF PROSTATE      X 2   • INGUINAL HERNIA REPAIR Right 10/10/2016    Procedure: LAPAROSCOPIC RIGHT INGUINAL HERNIA  REPAIR WITH MESH;  Surgeon: Saad Cardenas MD;  Location: [facility];  Service:    • NOSE SURGERY     • PACEMAKER IMPLANTATION      RIGHT CHEST-NONFUNCTIONING     Family History   Problem Relation Age of Onset   • Heart disease Mother         Cancer & Heart   • Cancer Mother         vaginal   • Heart disease Brother         pacemaker   • Lung cancer Brother    • Lung cancer Father    • Alcohol abuse Father    • Heart disease Brother         Cancer     Social History   Substance Use Topics   • Smoking status: Former Smoker     Packs/day: 0.25     Years: 0.50     Start date: 9/1/1953     Quit date: 1953   • Smokeless tobacco: Never Used   • Alcohol use Yes      Comment: Beer with boiled seafood; wine S TOASTS           ECG 12 Lead  Date/Time: 9/25/2018 2:33 PM  Performed by: MIRLANDE ANDERSON  "by: MIRLANDE ANDERSON   Comparison: compared with previous ECG   Similar to previous ECG  Comments: Biventricular paced rhythm               Objective:         Visit Vitals  /64 (BP Location: Right arm, Patient Position: Sitting)   Pulse 71   Ht 182.9 cm (72\")   Wt 87.1 kg (192 lb)   BMI 26.04 kg/m²          Physical Exam   Constitutional: He is oriented to person, place, and time. He appears well-developed and well-nourished.   HENT:   Head: Normocephalic and atraumatic.   Neck: No JVD present. Carotid bruit is not present.   Cardiovascular: Normal rate, regular rhythm, S1 normal and S2 normal.  Exam reveals no gallop.    No murmur heard.  Pulses:       Radial pulses are 2+ on the right side, and 2+ on the left side.   No bilateral lower extremity edema   Pulmonary/Chest: Effort normal and breath sounds normal.   Abdominal: Soft. Normal appearance.   Neurological: He is alert and oriented to person, place, and time.   Skin: Skin is warm, dry and intact.   Psychiatric: He has a normal mood and affect.       Lab Review:       Assessment:          Diagnosis Plan   1. Chronic atrial fibrillation (CMS/HCC)  XR Chest 2 View   2. History of non-ischemic cardiomyopathy     3. Non-rheumatic mitral regurgitation     4. Cardiac resynchronization therapy pacemaker (CRT-P) in place     5. On amiodarone therapy  XR Chest 2 View          Plan:       1.  Chronic atrial fibrillation.  He is biventricular paced today.  Unclear whether he is in sinus rhythm or atrial fibrillation.  He is on chronic amiodarone therapy.  He has not been on anticoagulation the past because of a history of falls and overall frailty.  2.  History of nonischemic cardiomyopathy.  This has since resolved with his most recent echocardiogram showing EF of 60%.  3.  Status post biventricular pacemaker.  Routine device check today shows normal function.  4.  Mitral regurgitation.  Reported as severe as last echocardiogram the patient does not have any " significant symptoms on a low-dose of furosemide.  5.  Chronic kidney disease stage IV.  Followed closely by nephrology.  6.  Chronic amiodarone use.  He has had recent LFTs are normal.  He reports that he gets his thyroid checked routinely with Dr. Olivares.  FOR a chest x-ray.    Will see the patient back again in 6 months.    Atrial Fibrillation and Atrial Flutter  Assessment  • The patient has permanent atrial fibrillation  • This is non-valvular in etiology  • The patient's CHADS2-VASc score is 3  • A [id number] score of 2 or more is considered a high risk for a thromboembolic event    Plan  • Continue in atrial fibrillation with rate control  • Continue amiodarone for rhythm control

## 2018-10-15 ENCOUNTER — HOSPITAL ENCOUNTER (OUTPATIENT)
Dept: INFUSION THERAPY | Facility: HOSPITAL | Age: 83
Discharge: HOME | End: 2018-10-15
Admitting: INTERNAL MEDICINE

## 2018-10-15 ENCOUNTER — HOSPITAL ENCOUNTER (OUTPATIENT)
Dept: GENERAL RADIOLOGY | Facility: HOSPITAL | Age: 83
Discharge: HOME | End: 2018-10-15
Attending: INTERNAL MEDICINE

## 2018-10-15 VITALS
RESPIRATION RATE: 16 BRPM | SYSTOLIC BLOOD PRESSURE: 126 MMHG | DIASTOLIC BLOOD PRESSURE: 51 MMHG | HEART RATE: 81 BPM | OXYGEN SATURATION: 97 % | TEMPERATURE: 97.3 F

## 2018-10-15 DIAGNOSIS — I48.20 CHRONIC ATRIAL FIBRILLATION (HCC): ICD-10-CM

## 2018-10-15 DIAGNOSIS — Z79.899 ON AMIODARONE THERAPY: ICD-10-CM

## 2018-10-15 DIAGNOSIS — D63.1 ANEMIA OF CHRONIC RENAL FAILURE, STAGE 4 (SEVERE) (HCC): ICD-10-CM

## 2018-10-15 DIAGNOSIS — N18.4 ANEMIA OF CHRONIC RENAL FAILURE, STAGE 4 (SEVERE) (HCC): ICD-10-CM

## 2018-10-15 LAB
HCT VFR BLD AUTO: 35.3 % (ref 40.4–52.2)
HGB BLD-MCNC: 11.4 G/DL (ref 13.7–17.6)

## 2018-10-15 PROCEDURE — G0463 HOSPITAL OUTPT CLINIC VISIT: HCPCS

## 2018-10-15 PROCEDURE — 85014 HEMATOCRIT: CPT | Performed by: INTERNAL MEDICINE

## 2018-10-15 PROCEDURE — 85018 HEMOGLOBIN: CPT | Performed by: INTERNAL MEDICINE

## 2018-10-15 PROCEDURE — 71046 X-RAY EXAM CHEST 2 VIEWS: CPT

## 2018-10-15 PROCEDURE — 36415 COLL VENOUS BLD VENIPUNCTURE: CPT

## 2018-11-16 ENCOUNTER — HOSPITAL ENCOUNTER (OUTPATIENT)
Dept: INFUSION THERAPY | Facility: HOSPITAL | Age: 83
Discharge: HOME | End: 2018-11-16
Admitting: INTERNAL MEDICINE

## 2018-11-16 VITALS
DIASTOLIC BLOOD PRESSURE: 64 MMHG | RESPIRATION RATE: 20 BRPM | TEMPERATURE: 97.8 F | OXYGEN SATURATION: 99 % | HEART RATE: 76 BPM | SYSTOLIC BLOOD PRESSURE: 120 MMHG

## 2018-11-16 DIAGNOSIS — D63.1 ANEMIA OF CHRONIC RENAL FAILURE, STAGE 4 (SEVERE) (HCC): Primary | ICD-10-CM

## 2018-11-16 DIAGNOSIS — N18.4 ANEMIA OF CHRONIC RENAL FAILURE, STAGE 4 (SEVERE) (HCC): Primary | ICD-10-CM

## 2018-11-16 LAB
HCT VFR BLD AUTO: 35.9 % (ref 40.4–52.2)
HGB BLD-MCNC: 11.7 G/DL (ref 13.7–17.6)

## 2018-11-16 PROCEDURE — 85014 HEMATOCRIT: CPT | Performed by: INTERNAL MEDICINE

## 2018-11-16 PROCEDURE — G0463 HOSPITAL OUTPT CLINIC VISIT: HCPCS

## 2018-11-16 PROCEDURE — 85018 HEMOGLOBIN: CPT | Performed by: INTERNAL MEDICINE

## 2018-11-16 PROCEDURE — 36415 COLL VENOUS BLD VENIPUNCTURE: CPT

## 2018-12-18 ENCOUNTER — HOSPITAL ENCOUNTER (OUTPATIENT)
Dept: INFUSION THERAPY | Facility: HOSPITAL | Age: 83
Discharge: HOME | End: 2018-12-18
Admitting: INTERNAL MEDICINE

## 2018-12-18 VITALS
RESPIRATION RATE: 16 BRPM | DIASTOLIC BLOOD PRESSURE: 62 MMHG | HEART RATE: 72 BPM | SYSTOLIC BLOOD PRESSURE: 129 MMHG | TEMPERATURE: 97.5 F | OXYGEN SATURATION: 99 %

## 2018-12-18 DIAGNOSIS — N18.4 ANEMIA OF CHRONIC RENAL FAILURE, STAGE 4 (SEVERE) (HCC): Primary | ICD-10-CM

## 2018-12-18 DIAGNOSIS — D63.1 ANEMIA OF CHRONIC RENAL FAILURE, STAGE 4 (SEVERE) (HCC): Primary | ICD-10-CM

## 2018-12-18 LAB
25(OH)D3 SERPL-MCNC: 48.9 NG/ML (ref 30–100)
ALBUMIN SERPL-MCNC: 3.9 G/DL (ref 3.5–5.2)
ALBUMIN/GLOB SERPL: 1.1 G/DL
ALP SERPL-CCNC: 76 U/L (ref 39–117)
ALT SERPL W P-5'-P-CCNC: 9 U/L (ref 1–41)
ANION GAP SERPL CALCULATED.3IONS-SCNC: 10.9 MMOL/L
AST SERPL-CCNC: 19 U/L (ref 1–40)
BILIRUB SERPL-MCNC: 0.7 MG/DL (ref 0.1–1.2)
BUN BLD-MCNC: 33 MG/DL (ref 8–23)
BUN/CREAT SERPL: 12.3 (ref 7–25)
CALCIUM SPEC-SCNC: 8.9 MG/DL (ref 8.2–9.6)
CALCIUM SPEC-SCNC: 9 MG/DL (ref 8.2–9.6)
CHLORIDE SERPL-SCNC: 104 MMOL/L (ref 98–107)
CO2 SERPL-SCNC: 26.1 MMOL/L (ref 22–29)
CREAT BLD-MCNC: 2.68 MG/DL (ref 0.76–1.27)
DEPRECATED RDW RBC AUTO: 46.4 FL (ref 37–54)
ERYTHROCYTE [DISTWIDTH] IN BLOOD BY AUTOMATED COUNT: 13.7 % (ref 11.5–14.5)
FERRITIN SERPL-MCNC: 152.4 NG/ML (ref 30–400)
FOLATE SERPL-MCNC: >20 NG/ML (ref 4.78–24.2)
GFR SERPL CREATININE-BSD FRML MDRD: 23 ML/MIN/1.73
GLOBULIN UR ELPH-MCNC: 3.4 GM/DL
GLUCOSE BLD-MCNC: 79 MG/DL (ref 65–99)
HCT VFR BLD AUTO: 36.4 % (ref 40.4–52.2)
HGB BLD-MCNC: 11.7 G/DL (ref 13.7–17.6)
IRON 24H UR-MRATE: 85 MCG/DL (ref 59–158)
IRON SATN MFR SERPL: 27 % (ref 20–50)
MCH RBC QN AUTO: 29.9 PG (ref 27–32.7)
MCHC RBC AUTO-ENTMCNC: 32.1 G/DL (ref 32.6–36.4)
MCV RBC AUTO: 93.1 FL (ref 79.8–96.2)
PHOSPHATE SERPL-MCNC: 3.9 MG/DL (ref 2.5–4.5)
PLATELET # BLD AUTO: 153 10*3/MM3 (ref 140–500)
PMV BLD AUTO: 9.3 FL (ref 6–12)
POTASSIUM BLD-SCNC: 4 MMOL/L (ref 3.5–5.2)
PROT SERPL-MCNC: 7.3 G/DL (ref 6–8.5)
PTH-INTACT SERPL-MCNC: 117.7 PG/ML (ref 15–65)
RBC # BLD AUTO: 3.91 10*6/MM3 (ref 4.6–6)
SODIUM BLD-SCNC: 141 MMOL/L (ref 136–145)
TIBC SERPL-MCNC: 320 MCG/DL
TRANSFERRIN SERPL-MCNC: 215 MG/DL (ref 200–360)
URATE SERPL-MCNC: 7.8 MG/DL (ref 3.4–7)
VIT B12 BLD-MCNC: 1312 PG/ML (ref 211–946)
WBC NRBC COR # BLD: 4.13 10*3/MM3 (ref 4.5–10.7)

## 2018-12-18 PROCEDURE — 84550 ASSAY OF BLOOD/URIC ACID: CPT | Performed by: INTERNAL MEDICINE

## 2018-12-18 PROCEDURE — 82746 ASSAY OF FOLIC ACID SERUM: CPT | Performed by: INTERNAL MEDICINE

## 2018-12-18 PROCEDURE — 85027 COMPLETE CBC AUTOMATED: CPT | Performed by: INTERNAL MEDICINE

## 2018-12-18 PROCEDURE — 83970 ASSAY OF PARATHORMONE: CPT | Performed by: INTERNAL MEDICINE

## 2018-12-18 PROCEDURE — 84100 ASSAY OF PHOSPHORUS: CPT | Performed by: INTERNAL MEDICINE

## 2018-12-18 PROCEDURE — G0463 HOSPITAL OUTPT CLINIC VISIT: HCPCS

## 2018-12-18 PROCEDURE — 82306 VITAMIN D 25 HYDROXY: CPT | Performed by: INTERNAL MEDICINE

## 2018-12-18 PROCEDURE — 82607 VITAMIN B-12: CPT | Performed by: INTERNAL MEDICINE

## 2018-12-18 PROCEDURE — 36415 COLL VENOUS BLD VENIPUNCTURE: CPT

## 2018-12-18 PROCEDURE — 84466 ASSAY OF TRANSFERRIN: CPT | Performed by: INTERNAL MEDICINE

## 2018-12-18 PROCEDURE — 82728 ASSAY OF FERRITIN: CPT | Performed by: INTERNAL MEDICINE

## 2018-12-18 PROCEDURE — 83540 ASSAY OF IRON: CPT | Performed by: INTERNAL MEDICINE

## 2018-12-18 PROCEDURE — 80053 COMPREHEN METABOLIC PANEL: CPT | Performed by: INTERNAL MEDICINE

## 2018-12-18 RX ORDER — OMEPRAZOLE 20 MG/1
20 CAPSULE, DELAYED RELEASE ORAL DAILY
COMMUNITY

## 2018-12-26 ENCOUNTER — CLINICAL SUPPORT NO REQUIREMENTS (OUTPATIENT)
Dept: CARDIOLOGY | Facility: CLINIC | Age: 83
End: 2018-12-26

## 2018-12-26 DIAGNOSIS — I48.20 CHRONIC ATRIAL FIBRILLATION (HCC): ICD-10-CM

## 2018-12-26 DIAGNOSIS — I42.8 NONISCHEMIC CARDIOMYOPATHY (HCC): Primary | ICD-10-CM

## 2018-12-26 PROCEDURE — 93296 REM INTERROG EVL PM/IDS: CPT | Performed by: INTERNAL MEDICINE

## 2018-12-26 PROCEDURE — 93297 REM INTERROG DEV EVAL ICPMS: CPT | Performed by: INTERNAL MEDICINE

## 2018-12-26 PROCEDURE — 93294 REM INTERROG EVL PM/LDLS PM: CPT | Performed by: INTERNAL MEDICINE

## 2019-01-14 ENCOUNTER — OFFICE VISIT (OUTPATIENT)
Dept: SLEEP MEDICINE | Facility: HOSPITAL | Age: 84
End: 2019-01-14
Attending: INTERNAL MEDICINE

## 2019-01-14 VITALS
OXYGEN SATURATION: 95 % | DIASTOLIC BLOOD PRESSURE: 62 MMHG | SYSTOLIC BLOOD PRESSURE: 117 MMHG | HEART RATE: 72 BPM | BODY MASS INDEX: 25.9 KG/M2 | WEIGHT: 191 LBS

## 2019-01-14 DIAGNOSIS — G47.33 OSA ON CPAP: Primary | ICD-10-CM

## 2019-01-14 DIAGNOSIS — Z99.89 OSA ON CPAP: Primary | ICD-10-CM

## 2019-01-14 PROCEDURE — G0463 HOSPITAL OUTPT CLINIC VISIT: HCPCS

## 2019-01-14 NOTE — PROGRESS NOTES
[facility]         Dr Alex Kat  [unfilled]  Patient Care Team:  Riki Olivares MD as PCP - General (General Practice)    Chief Complaint:Follow-up [facility] with underlying history of A. fib hypertension and pacemaker.  Initial studies showed AHI of 27 events per hour.        Interval History: Patient returns today for compliance visit.  Currently compliance 100% with average daily use of 8 hours 41 minutes.  AHI 2.3 events per hour with leak of 45 minutes off and on and improving recently.  Patient feels benefit from the machine.  No issues with the mask or machine as such.  Goes to bed at 10 PM gets up 640 in the morning.  No tobacco no alcohol or caffeine abuse.  Currently has a full facemask that fits spell.    REVIEW OF SYSTEMS:   CARDIOVASCULAR: No chest pain, chest pressure or chest discomfort. No palpitations or edema.   RESPIRATORY: No shortness of breath, cough or sputum.   GASTROINTESTINAL: No anorexia, nausea, vomiting or diarrhea. No abdominal pain or blood.   HEMATOLOGIC: No bleeding or bruising.   Positive for shortness of breath and nasal congestion  [place] 4 out of 24 within normal limits    Ventilator/Non-Invasive Ventilation Settings (From admission, onward)    None            Vital Signs  Heart Rate:  [72] 72  BP: (117)/(62) 117/62  [unfilled]  Flowsheet Rows      First Filed Value   Admission Height  No data   Admission Weight  86.6 kg (191 lb) Documented at 01/14/2019 1058          Physical Exam:   General Appearance:    Alert, cooperative, in no acute distress  ENT Mallampati between 3 and 4    Lungs:     Clear to auscultation,respirations regular, even and                  unlabored    Heart:    Regular rhythm and normal rate, normal S1 and S2, no            murmur, no gallop, no rub, no click   Chest Wall:    No abnormalities observed   Abdomen:     Normal bowel sounds, no masses, no organomegaly, soft        non-tender, non-distended, no guarding, no rebound                 tenderness   Extremities:   Moves all extremities well, no edema, no cyanosis, no             redness     Results Review:                                          I reviewed the patient's new clinical results.  I personally viewed and interpreted the patient's CXR        Medication Review:         No current facility-administered medications for this visit.     ASSESSMENT:   Obstructive sleep apnea syndrome comorbidities of A. fib hypertension and pacemaker         PLAN:  Compliance AHI and leak acceptable  Discussed compliance download with the patient  Currently patient happy with his current settings  Patient benefiting from CPAP  Discussed the importance of CPAP and compliance with underlying history of A. fib and pacemaker  Sleep hygiene measures to be continued  Supplies will be continued for 1 year      Alex Kat MD  01/14/19  11:17 AM

## 2019-01-18 ENCOUNTER — APPOINTMENT (OUTPATIENT)
Dept: INFUSION THERAPY | Facility: HOSPITAL | Age: 84
End: 2019-01-18

## 2019-01-21 ENCOUNTER — APPOINTMENT (OUTPATIENT)
Dept: INFUSION THERAPY | Facility: HOSPITAL | Age: 84
End: 2019-01-21

## 2019-02-18 ENCOUNTER — HOSPITAL ENCOUNTER (OUTPATIENT)
Dept: INFUSION THERAPY | Facility: HOSPITAL | Age: 84
Discharge: HOME | End: 2019-02-18
Admitting: INTERNAL MEDICINE

## 2019-02-18 VITALS
DIASTOLIC BLOOD PRESSURE: 66 MMHG | OXYGEN SATURATION: 99 % | HEART RATE: 85 BPM | SYSTOLIC BLOOD PRESSURE: 133 MMHG | RESPIRATION RATE: 16 BRPM | TEMPERATURE: 95.4 F

## 2019-02-18 DIAGNOSIS — N18.4 ANEMIA OF CHRONIC RENAL FAILURE, STAGE 4 (SEVERE) (HCC): Primary | ICD-10-CM

## 2019-02-18 DIAGNOSIS — D63.1 ANEMIA OF CHRONIC RENAL FAILURE, STAGE 4 (SEVERE) (HCC): Primary | ICD-10-CM

## 2019-02-18 LAB
HCT VFR BLD AUTO: 36.9 % (ref 37.5–51)
HGB BLD-MCNC: 11.6 G/DL (ref 13–17.7)

## 2019-02-18 PROCEDURE — G0463 HOSPITAL OUTPT CLINIC VISIT: HCPCS

## 2019-02-18 PROCEDURE — 36415 COLL VENOUS BLD VENIPUNCTURE: CPT

## 2019-02-18 PROCEDURE — 85014 HEMATOCRIT: CPT | Performed by: INTERNAL MEDICINE

## 2019-02-18 PROCEDURE — 85018 HEMOGLOBIN: CPT | Performed by: INTERNAL MEDICINE

## 2019-03-18 ENCOUNTER — HOSPITAL ENCOUNTER (OUTPATIENT)
Dept: INFUSION THERAPY | Facility: HOSPITAL | Age: 84
Discharge: HOME | End: 2019-03-18
Admitting: INTERNAL MEDICINE

## 2019-03-18 VITALS
DIASTOLIC BLOOD PRESSURE: 62 MMHG | TEMPERATURE: 95.7 F | HEART RATE: 99 BPM | SYSTOLIC BLOOD PRESSURE: 143 MMHG | OXYGEN SATURATION: 99 % | RESPIRATION RATE: 16 BRPM

## 2019-03-18 DIAGNOSIS — D63.1 ANEMIA OF CHRONIC RENAL FAILURE, STAGE 4 (SEVERE) (HCC): Primary | ICD-10-CM

## 2019-03-18 DIAGNOSIS — N18.4 ANEMIA OF CHRONIC RENAL FAILURE, STAGE 4 (SEVERE) (HCC): Primary | ICD-10-CM

## 2019-03-18 LAB
25(OH)D3 SERPL-MCNC: 47.5 NG/ML (ref 30–100)
ALBUMIN SERPL-MCNC: 4.1 G/DL (ref 3.5–5.2)
ALBUMIN/GLOB SERPL: 1.1 G/DL
ALP SERPL-CCNC: 72 U/L (ref 39–117)
ALT SERPL W P-5'-P-CCNC: 14 U/L (ref 1–41)
ANION GAP SERPL CALCULATED.3IONS-SCNC: 14.4 MMOL/L
AST SERPL-CCNC: 18 U/L (ref 1–40)
BILIRUB SERPL-MCNC: 0.8 MG/DL (ref 0.1–1.2)
BUN BLD-MCNC: 29 MG/DL (ref 8–23)
BUN/CREAT SERPL: 12.4 (ref 7–25)
CALCIUM SPEC-SCNC: 9.3 MG/DL (ref 8.2–9.6)
CALCIUM SPEC-SCNC: 9.3 MG/DL (ref 8.2–9.6)
CHLORIDE SERPL-SCNC: 100 MMOL/L (ref 98–107)
CO2 SERPL-SCNC: 25.6 MMOL/L (ref 22–29)
CREAT BLD-MCNC: 2.34 MG/DL (ref 0.76–1.27)
DEPRECATED RDW RBC AUTO: 47.6 FL (ref 37–54)
ERYTHROCYTE [DISTWIDTH] IN BLOOD BY AUTOMATED COUNT: 13.5 % (ref 12.3–15.4)
FERRITIN SERPL-MCNC: 163 NG/ML (ref 30–400)
FOLATE SERPL-MCNC: 19.8 NG/ML (ref 4.78–24.2)
GFR SERPL CREATININE-BSD FRML MDRD: 26 ML/MIN/1.73
GLOBULIN UR ELPH-MCNC: 3.6 GM/DL
GLUCOSE BLD-MCNC: 109 MG/DL (ref 65–99)
HCT VFR BLD AUTO: 39.5 % (ref 37.5–51)
HGB BLD-MCNC: 12.3 G/DL (ref 13–17.7)
IRON 24H UR-MRATE: 90 MCG/DL (ref 59–158)
IRON SATN MFR SERPL: 27 % (ref 20–50)
MCH RBC QN AUTO: 29.8 PG (ref 26.6–33)
MCHC RBC AUTO-ENTMCNC: 31.1 G/DL (ref 31.5–35.7)
MCV RBC AUTO: 95.6 FL (ref 79–97)
PHOSPHATE SERPL-MCNC: 3.1 MG/DL (ref 2.5–4.5)
PLATELET # BLD AUTO: 171 10*3/MM3 (ref 140–450)
PMV BLD AUTO: 9.6 FL (ref 6–12)
POTASSIUM BLD-SCNC: 4 MMOL/L (ref 3.5–5.2)
PROT SERPL-MCNC: 7.7 G/DL (ref 6–8.5)
PTH-INTACT SERPL-MCNC: 79.6 PG/ML (ref 15–65)
RBC # BLD AUTO: 4.13 10*6/MM3 (ref 4.14–5.8)
SODIUM BLD-SCNC: 140 MMOL/L (ref 136–145)
TIBC SERPL-MCNC: 331 MCG/DL (ref 298–536)
TRANSFERRIN SERPL-MCNC: 222 MG/DL (ref 200–360)
TRANSFERRIN SERPL-MCNC: 222 MG/DL (ref 200–360)
URATE SERPL-MCNC: 7.8 MG/DL (ref 3.4–7)
VIT B12 BLD-MCNC: 1242 PG/ML (ref 211–946)
WBC NRBC COR # BLD: 3.98 10*3/MM3 (ref 3.4–10.8)

## 2019-03-18 PROCEDURE — 85027 COMPLETE CBC AUTOMATED: CPT | Performed by: INTERNAL MEDICINE

## 2019-03-18 PROCEDURE — 82728 ASSAY OF FERRITIN: CPT | Performed by: INTERNAL MEDICINE

## 2019-03-18 PROCEDURE — 84100 ASSAY OF PHOSPHORUS: CPT | Performed by: INTERNAL MEDICINE

## 2019-03-18 PROCEDURE — 84466 ASSAY OF TRANSFERRIN: CPT | Performed by: INTERNAL MEDICINE

## 2019-03-18 PROCEDURE — 82746 ASSAY OF FOLIC ACID SERUM: CPT | Performed by: INTERNAL MEDICINE

## 2019-03-18 PROCEDURE — 82607 VITAMIN B-12: CPT | Performed by: INTERNAL MEDICINE

## 2019-03-18 PROCEDURE — 36415 COLL VENOUS BLD VENIPUNCTURE: CPT

## 2019-03-18 PROCEDURE — 83540 ASSAY OF IRON: CPT | Performed by: INTERNAL MEDICINE

## 2019-03-18 PROCEDURE — G0463 HOSPITAL OUTPT CLINIC VISIT: HCPCS

## 2019-03-18 PROCEDURE — 83970 ASSAY OF PARATHORMONE: CPT | Performed by: INTERNAL MEDICINE

## 2019-03-18 PROCEDURE — 82306 VITAMIN D 25 HYDROXY: CPT | Performed by: INTERNAL MEDICINE

## 2019-03-18 PROCEDURE — 80053 COMPREHEN METABOLIC PANEL: CPT | Performed by: INTERNAL MEDICINE

## 2019-03-18 PROCEDURE — 84550 ASSAY OF BLOOD/URIC ACID: CPT | Performed by: INTERNAL MEDICINE

## 2019-03-26 ENCOUNTER — OFFICE VISIT (OUTPATIENT)
Dept: CARDIOLOGY | Facility: CLINIC | Age: 84
End: 2019-03-26

## 2019-03-26 ENCOUNTER — CLINICAL SUPPORT NO REQUIREMENTS (OUTPATIENT)
Dept: CARDIOLOGY | Facility: CLINIC | Age: 84
End: 2019-03-26

## 2019-03-26 VITALS
WEIGHT: 191 LBS | DIASTOLIC BLOOD PRESSURE: 68 MMHG | SYSTOLIC BLOOD PRESSURE: 142 MMHG | HEART RATE: 72 BPM | BODY MASS INDEX: 25.87 KG/M2 | HEIGHT: 72 IN

## 2019-03-26 DIAGNOSIS — I48.20 CHRONIC ATRIAL FIBRILLATION (HCC): Primary | ICD-10-CM

## 2019-03-26 DIAGNOSIS — Z95.0 CARDIAC RESYNCHRONIZATION THERAPY PACEMAKER (CRT-P) IN PLACE: ICD-10-CM

## 2019-03-26 DIAGNOSIS — I34.0 NON-RHEUMATIC MITRAL REGURGITATION: ICD-10-CM

## 2019-03-26 DIAGNOSIS — Z79.899 ON AMIODARONE THERAPY: ICD-10-CM

## 2019-03-26 DIAGNOSIS — I42.8 NON-ISCHEMIC CARDIOMYOPATHY (HCC): ICD-10-CM

## 2019-03-26 DIAGNOSIS — I42.8 NONISCHEMIC CARDIOMYOPATHY (HCC): Primary | ICD-10-CM

## 2019-03-26 DIAGNOSIS — I48.20 CHRONIC ATRIAL FIBRILLATION (HCC): ICD-10-CM

## 2019-03-26 DIAGNOSIS — R06.09 DYSPNEA ON EXERTION: ICD-10-CM

## 2019-03-26 PROCEDURE — 93290 INTERROG DEV EVAL ICPMS IP: CPT | Performed by: INTERNAL MEDICINE

## 2019-03-26 PROCEDURE — 93280 PM DEVICE PROGR EVAL DUAL: CPT | Performed by: INTERNAL MEDICINE

## 2019-03-26 PROCEDURE — 99214 OFFICE O/P EST MOD 30 MIN: CPT | Performed by: INTERNAL MEDICINE

## 2019-03-26 PROCEDURE — 93000 ELECTROCARDIOGRAM COMPLETE: CPT | Performed by: INTERNAL MEDICINE

## 2019-03-26 NOTE — PROGRESS NOTES
Subjective:     Encounter Date:03/26/2019      Patient ID: Zeb Obando Jr. is a 89 y.o. male.    Chief Complaint:  History of Present Illness    This is a 89-year-old male with a history of resolved nonischemic cardiomyopathy, nonobstructive coronary artery disease, chronic atrial fibrillation, history of ventricular tachycardia arrest, biventricular pacemaker, hypothyroidism, who presents for follow-up.     The patient was previously followed by Dr. Garcia and Dr. Whitney with [facility].  His prior cardiac history includes placement of a permanent pacemaker in 1981 for complete heart block.  In 2006 he presented with a syncopal episode that was associated with ventricular tachycardia/ventricular fibrillation episode that was detected on this pacemaker.  He was found to have an ejection fraction of around 10-15% around that time.  A cardiac catheterization showed only mild coronary artery disease.  At that point they abandoned his right-sided pacemaker in place a biventricular AICD on his left side.  Following his BiVICD placement a repeat echocardiogram was performed in 4/2014 that showed improvement of his ejection fraction to 45-50%.  In 10/2015 his device was at [facility] and after conversation with his family the patient decided he no longer wanted an AICD and opted to have it replaced with a biventricular pacemaker instead.   He has a history of chronic atrial fibrillation for which she has not been on anticoagulation because of overall frailty and recurrent falls.     When I began following the patient 3/2016 I repeated echocardiogram that was performed in 6/2016.  This showed normal left ventricular systolic function and wall motion with an ejection fraction of 60%, normal diastolic function, mitral valve prolapse of the anterior leaflet with severe mitral regurgitation, mildly elevated right ventricular pressures.  In follow up he has developed chronic kidney disease stage IV which is  closely followed by Dr. Sherman.       Today presents for routine follow-up.  He reports that since I saw him last he feels like his symptoms he has had some progressing symptoms which he wonders is related to his renal disease.  Reports some increased dyspnea on exertion.  He reports that he would get out of breath now walking about a city block and would probably have to stop.  He denies any chest pain.  He reports some intermittent palpitations but nothing severe.  Reports some lightheadedness with position changes.  He reports increased fatigue.  He has been having some back pain primarily in his right flank.  He denies any weight gain or lower extremity edema.  He continues to take his furosemide on a daily basis at 20 mg with an additional dose when he notices a weight gain overnight or lower extremity swelling.  He also reports some issues with epigastric discomfort and nausea before meals.  He reports that it improved some after eating but then will recur.  He was taking loratadine for a while but developed dizziness with this.  He has not tried anything else over-the-counter for his symptoms.    He had his pacemaker interrogated today and it shows normal function with underlying atrial fibrillation with slow ventricular rates in the 30s.  He is biventricular paced 98% of the time.  His optive all reports indicated a increase fluid buildup beginning around 3/8/2019 but this is a mild increase overall.    Review of Systems   Constitution: Positive for malaise/fatigue. Negative for weakness.   HENT: Negative for hearing loss, hoarse voice, nosebleeds and sore throat.    Eyes: Negative for pain.   Cardiovascular: Positive for dyspnea on exertion and palpitations. Negative for chest pain, claudication, cyanosis, irregular heartbeat, leg swelling, near-syncope, orthopnea, paroxysmal nocturnal dyspnea and syncope.   Respiratory: Negative for shortness of breath and snoring.    Endocrine: Negative for cold intolerance,  heat intolerance, polydipsia, polyphagia and polyuria.   Skin: Negative for itching and rash.   Musculoskeletal: Positive for back pain. Negative for arthritis, falls, joint pain, joint swelling, muscle cramps, muscle weakness and myalgias.   Gastrointestinal: Negative for constipation, diarrhea, dysphagia, heartburn, hematemesis, hematochezia, melena, nausea and vomiting.   Genitourinary: Negative for frequency, hematuria and hesitancy.   Neurological: Positive for light-headedness. Negative for excessive daytime sleepiness, dizziness, headaches and numbness.   Psychiatric/Behavioral: Negative for depression. The patient is not nervous/anxious.           Current Outpatient Medications:   •  Acetaminophen (TYLENOL PO), Take 325 mg by mouth 2 (Two) Times a Day., Disp: , Rfl:   •  amiodarone (PACERONE) 200 MG tablet, Take 100 mg by mouth Every Night., Disp: , Rfl:   •  cetirizine (ZyrTEC) 10 MG tablet, Take 10 mg by mouth Daily. PRN, Disp: , Rfl:   •  cholecalciferol (VITAMIN D3) 1000 units tablet, Take 1,000 Units by mouth Daily., Disp: , Rfl:   •  Cyanocobalamin (VITAMIN B 12 PO), Take 1 tablet by mouth Daily., Disp: , Rfl:   •  ferrous sulfate 325 (65 FE) MG tablet, Take 325 mg by mouth Daily With Breakfast., Disp: , Rfl:   •  finasteride (PROSCAR) 5 MG tablet, Take 5 mg by mouth Daily., Disp: , Rfl:   •  furosemide (LASIX) 20 MG tablet, Take 20 mg by mouth Daily., Disp: , Rfl:   •  HYDROcodone-acetaminophen (NORCO) 7.5-325 MG per tablet, Take 1 tablet by mouth Every 4 (Four) Hours As Needed for Moderate Pain ., Disp: 20 tablet, Rfl: 0  •  levothyroxine (SYNTHROID, LEVOTHROID) 100 MCG tablet, Take 100 mcg by mouth Every Morning., Disp: , Rfl:   •  omeprazole (priLOSEC) 20 MG capsule, Take 20 mg by mouth Daily., Disp: , Rfl:   •  RaNITidine HCl (ZANTAC PO), Take 1 tablet by mouth As Needed., Disp: , Rfl:   •  triamcinolone (KENALOG) 0.1 % lotion, Apply 1 application topically As Needed., Disp: , Rfl:     Past  Medical History:   Diagnosis Date   • Abnormal ECG 1979   • AICD (automatic cardioverter/defibrillator) present    • Anemia of chronic renal failure 12/6/2017   • Atrial fibrillation (CMS/HCC)    • AV block    • Blockage of kidney vein (CMS/HCC)    • BPH (benign prostatic hypertrophy)    • Cardiac arrest (CMS/HCC)    • Cardiomyopathy (CMS/HCC)    • CHF (congestive heart failure) (CMS/HCC)    • Chronic kidney disease    • Clotting disorder (CMS/[facility])    • Coronary artery disease    • Disease of thyroid gland    • Diverticulitis of colon    • Diverticulosis    • Edema    • Gallbladder disease    • GI (gastrointestinal bleed)     while on coumadin   • Gout     RIGHT GREAT TOE   • Health care maintenance    • Hearing loss of both ears    • Heart disease    • Heart failure (CMS/HCC)    • History of benign prostatic hypertrophy    • History of cataract    • History of heart failure    • History of hypertension    • History of prostatitis    • Hoarseness    • Hypertension    • Inguinal hernia    • Irregular heart rate    • Itchy skin    • Lupus    • Muscle weakness    • Prostatitis    • SLE (systemic lupus erythematosus) (CMS/[facility]) 1980   • Sleep apnea    • Systemic lupus erythematosus (CMS/[facility])    • Thyroid disease    • VT (ventricular tachycardia) (CMS/[facility])    • Wears glasses    • Weight loss      Past Surgical History:   Procedure Laterality Date   • CARDIAC PACEMAKER PLACEMENT  10/30/2015    [facility] [facility] PAC[facility]-DR. REINA KATZ-LEFT CHEST   • CATARACT EXTRACTION     • CHOLECYSTECTOMY  1995   • CORONARY ANGIOPLASTY     • CYSTOSCOPY TRANSURETHRAL RESECTION OF PROSTATE      X 2   • INGUINAL HERNIA REPAIR Right 10/10/2016    Procedure: LAPAROSCOPIC RIGHT INGUINAL HERNIA  REPAIR WITH MESH;  Surgeon: Saad Cardenas MD;  Location: [facility];  Service:    • NOSE SURGERY     • PACEMAKER IMPLANTATION      RIGHT CHEST-NONFUNCTIONING     Family History   Problem Relation Age of Onset   • Heart  "disease Mother         Cancer & Heart   • Cancer Mother         vaginal   • Heart disease Brother         pacemaker   • Lung cancer Brother    • Lung cancer Father    • Alcohol abuse Father    • Heart disease Brother         Cancer     Social History     Tobacco Use   • Smoking status: Former Smoker     Packs/day: 0.25     Years: 0.50     Pack years: 0.12     Start date: 1953     Last attempt to quit:      Years since quittin.2   • Smokeless tobacco: Never Used   Substance Use Topics   • Alcohol use: Yes     Comment: Beer with boiled seafood; wine S TOASTS   • Drug use: No           ECG 12 Lead  Date/Time: 3/26/2019 12:29 PM  Performed by: Vangie Olmstead MD  Authorized by: Vangie Olmstead MD   Comparison: compared with previous ECG   Similar to previous ECG  Rhythm: atrial fibrillation  Pacing: ventricular paced rhythm             Objective:         Visit Vitals  /68 (BP Location: Right arm, Patient Position: Sitting, Cuff Size: Adult)   Pulse 72   Ht 182.9 cm (72\")   Wt 86.6 kg (191 lb)   BMI 25.90 kg/m²          Physical Exam   Constitutional: He is oriented to person, place, and time. He appears well-developed and well-nourished.   HENT:   Head: Normocephalic and atraumatic.   Neck: No JVD present. Carotid bruit is not present.   Cardiovascular: Normal rate, regular rhythm, S1 normal and S2 normal. Exam reveals no gallop.   No murmur heard.  Pulses:       Radial pulses are 2+ on the right side, and 2+ on the left side.   No bilateral lower extremity edema   Pulmonary/Chest: Effort normal and breath sounds normal.   Abdominal: Soft. Normal appearance.   Neurological: He is alert and oriented to person, place, and time.   Skin: Skin is warm, dry and intact.   Psychiatric: He has a normal mood and affect.       Lab Review:       Assessment:          Diagnosis Plan   1. Chronic atrial fibrillation (CMS/HCC)     2. History of non-ischemic cardiomyopathy     3. Non-rheumatic mitral regurgitation   "   4. Cardiac resynchronization therapy pacemaker (CRT-P) in place     5. On amiodarone therapy            Plan:       1.  Dyspnea on exertion/fatigue.  I wonder if he is getting a little bit more volume overloaded although it is relatively subclinical since he is not having any issues with weight gain.  I did discuss the possibility of repeating an echocardiogram to see if there is been any change in his left ventricular function or his valvular disease.  However this probably will not change his management since it is unlikely that we will do anything more than adjust his medical management by adjusting his diuretic therapy.  I do not feel comfortable adjusting his diuretic therapy myself and recommended that the patient follow-up with Dr. Olivares and Dr. Sherman for further workup.  2.  Chronic atrial fibrillation.  He is currently in atrial fibrillation underlying his ventricular paced rhythm.  He is asymptomatic with this.  He is not on anticoagulation due to his overall frailty and history of frequent falls.  3.  Mitral regurgitation.  This was noted to be severe in the past.  It is possible that this could be contributing to his symptoms.  Again he is not a good candidate for anything aggressive to address this and medical management would be the best option.  4.  Biventricular pacemaker placement.  Normal function on his interrogation today.  5.  History of ventricular tachycardia/fibrillation.  On chronic amiodarone therapy.  No recent events.  6.  Chronic amiodarone therapy.  Recent liver panel was unremarkable.  His thyroid function is followed closely by Dr. Olivares.  Chest x-ray in 10/2018 was normal.  7.  Epigastric discomfort.  Suspicious for gastritis.  He did not tolerate when asked again.  I suggested he try omeprazole or esomeprazole over-the-counter for his symptoms.    We will tentatively plan on seeing the patient back again in 6 months or sooner if further issues arise.    Atrial Fibrillation and  Atrial Flutter  Assessment  • The patient has permanent atrial fibrillation  • This is non-valvular in etiology  • The patient's CHADS2-VASc score is 3  • A [id number] score of 2 or more is considered a high risk for a thromboembolic event    Plan  • Continue in atrial fibrillation with rate control  • Continue amiodarone for rhythm control    Heart Failure  Subjective/Objective    • Physical exam findings negative for elevated JVP.

## 2019-04-18 ENCOUNTER — HOSPITAL ENCOUNTER (OUTPATIENT)
Dept: INFUSION THERAPY | Facility: HOSPITAL | Age: 84
Discharge: HOME | End: 2019-04-18
Admitting: INTERNAL MEDICINE

## 2019-04-18 VITALS
TEMPERATURE: 97.6 F | RESPIRATION RATE: 20 BRPM | DIASTOLIC BLOOD PRESSURE: 63 MMHG | SYSTOLIC BLOOD PRESSURE: 139 MMHG | HEART RATE: 82 BPM | OXYGEN SATURATION: 99 %

## 2019-04-18 DIAGNOSIS — N18.4 ANEMIA OF CHRONIC RENAL FAILURE, STAGE 4 (SEVERE) (HCC): Primary | ICD-10-CM

## 2019-04-18 DIAGNOSIS — D63.1 ANEMIA OF CHRONIC RENAL FAILURE, STAGE 4 (SEVERE) (HCC): Primary | ICD-10-CM

## 2019-04-18 LAB
HCT VFR BLD AUTO: 37.9 % (ref 37.5–51)
HGB BLD-MCNC: 12.2 G/DL (ref 13–17.7)

## 2019-04-18 PROCEDURE — 36415 COLL VENOUS BLD VENIPUNCTURE: CPT

## 2019-04-18 PROCEDURE — 85014 HEMATOCRIT: CPT | Performed by: INTERNAL MEDICINE

## 2019-04-18 PROCEDURE — 85018 HEMOGLOBIN: CPT | Performed by: INTERNAL MEDICINE

## 2019-04-18 PROCEDURE — G0463 HOSPITAL OUTPT CLINIC VISIT: HCPCS

## 2019-05-16 ENCOUNTER — APPOINTMENT (OUTPATIENT)
Dept: INFUSION THERAPY | Facility: HOSPITAL | Age: 84
End: 2019-05-16

## 2021-03-07 NOTE — PROGRESS NOTES
HGB LESS THAN 11.0, PROCRIT INJECTION INDICATED AND GIVEN. PATIENT TOLERATED WELL. DISCHARGED HOME AMB AFTER APPOINTMENT, REFUSED AVS STATES HE WILL GET ON MY CHART.   
Male